# Patient Record
Sex: MALE | Race: BLACK OR AFRICAN AMERICAN | NOT HISPANIC OR LATINO | Employment: FULL TIME | URBAN - METROPOLITAN AREA
[De-identification: names, ages, dates, MRNs, and addresses within clinical notes are randomized per-mention and may not be internally consistent; named-entity substitution may affect disease eponyms.]

---

## 2023-02-15 PROBLEM — M48.062 LUMBAR STENOSIS WITH NEUROGENIC CLAUDICATION: Status: ACTIVE | Noted: 2023-02-15

## 2023-02-15 PROBLEM — E66.9 CLASS 1 OBESITY WITH BODY MASS INDEX (BMI) OF 33.0 TO 33.9 IN ADULT: Status: ACTIVE | Noted: 2023-02-15

## 2023-02-15 PROBLEM — E66.811 CLASS 1 OBESITY WITH BODY MASS INDEX (BMI) OF 33.0 TO 33.9 IN ADULT: Status: ACTIVE | Noted: 2023-02-15

## 2023-02-15 PROBLEM — E78.5 HYPERLIPIDEMIA: Status: ACTIVE | Noted: 2023-02-15

## 2023-02-15 PROBLEM — M51.16 LUMBAR DISC HERNIATION WITH RADICULOPATHY: Status: ACTIVE | Noted: 2023-02-15

## 2023-02-15 PROBLEM — M54.16 LUMBAR RADICULOPATHY: Status: ACTIVE | Noted: 2023-02-15

## 2023-02-15 PROBLEM — H52.4 MYOPIA WITH ASTIGMATISM AND PRESBYOPIA: Status: ACTIVE | Noted: 2023-02-15

## 2023-02-15 PROBLEM — H52.10 MYOPIA WITH ASTIGMATISM AND PRESBYOPIA: Status: ACTIVE | Noted: 2023-02-15

## 2023-02-15 PROBLEM — E11.9 DIABETES MELLITUS (MULTI): Status: ACTIVE | Noted: 2023-02-15

## 2023-02-15 PROBLEM — H52.209 MYOPIA WITH ASTIGMATISM AND PRESBYOPIA: Status: ACTIVE | Noted: 2023-02-15

## 2023-02-15 PROBLEM — M54.30 SCIATICA: Status: ACTIVE | Noted: 2023-02-15

## 2023-02-15 PROBLEM — H02.889 MGD (MEIBOMIAN GLAND DISEASE): Status: ACTIVE | Noted: 2023-02-15

## 2023-02-15 PROBLEM — I10 HYPERTENSION: Status: ACTIVE | Noted: 2023-02-15

## 2023-02-15 PROBLEM — R53.83 FATIGUE: Status: ACTIVE | Noted: 2023-02-15

## 2023-02-15 PROBLEM — H35.033 BILATERAL HYPERTENSIVE RETINOPATHY: Status: ACTIVE | Noted: 2023-02-15

## 2023-02-15 PROBLEM — M54.50 LOWER BACK PAIN: Status: ACTIVE | Noted: 2023-02-15

## 2023-02-15 PROBLEM — R68.82 LIBIDO, DECREASED: Status: ACTIVE | Noted: 2023-02-15

## 2023-02-15 PROBLEM — R94.31 ABNORMAL EKG: Status: ACTIVE | Noted: 2023-02-15

## 2023-02-15 PROBLEM — H26.9 CATARACT: Status: ACTIVE | Noted: 2023-02-15

## 2023-02-15 PROBLEM — N52.9 MALE ERECTILE DISORDER: Status: ACTIVE | Noted: 2023-02-15

## 2023-02-15 RX ORDER — OLMESARTAN MEDOXOMIL 40 MG/1
40 TABLET ORAL DAILY
COMMUNITY
End: 2023-03-07 | Stop reason: ALTCHOICE

## 2023-02-15 RX ORDER — SILDENAFIL 100 MG/1
1 TABLET, FILM COATED ORAL DAILY PRN
COMMUNITY
Start: 2018-09-12

## 2023-03-07 ENCOUNTER — OFFICE VISIT (OUTPATIENT)
Dept: PRIMARY CARE | Facility: CLINIC | Age: 58
End: 2023-03-07
Payer: COMMERCIAL

## 2023-03-07 DIAGNOSIS — Z00.00 HEALTH MAINTENANCE EXAMINATION: ICD-10-CM

## 2023-03-07 DIAGNOSIS — I10 HYPERTENSION, UNSPECIFIED TYPE: Primary | ICD-10-CM

## 2023-03-07 DIAGNOSIS — E11.69 TYPE 2 DIABETES MELLITUS WITH OTHER SPECIFIED COMPLICATION, WITHOUT LONG-TERM CURRENT USE OF INSULIN (MULTI): ICD-10-CM

## 2023-03-07 PROCEDURE — 4010F ACE/ARB THERAPY RXD/TAKEN: CPT | Performed by: STUDENT IN AN ORGANIZED HEALTH CARE EDUCATION/TRAINING PROGRAM

## 2023-03-07 PROCEDURE — 99204 OFFICE O/P NEW MOD 45 MIN: CPT | Performed by: STUDENT IN AN ORGANIZED HEALTH CARE EDUCATION/TRAINING PROGRAM

## 2023-03-07 RX ORDER — AMLODIPINE BESYLATE 10 MG/1
10 TABLET ORAL DAILY
Qty: 30 TABLET | Refills: 5 | Status: SHIPPED | OUTPATIENT
Start: 2023-03-07 | End: 2023-10-31

## 2023-03-07 RX ORDER — ATORVASTATIN CALCIUM 40 MG/1
40 TABLET, FILM COATED ORAL DAILY
Qty: 90 TABLET | Refills: 1 | Status: SHIPPED | OUTPATIENT
Start: 2023-03-07 | End: 2023-10-10

## 2023-03-07 RX ORDER — LISINOPRIL 20 MG/1
20 TABLET ORAL DAILY
COMMUNITY

## 2023-03-07 NOTE — PROGRESS NOTES
David Linton is a 57 y.o. MALE seen in Clinic at [facility] by Dr. Jarret Terry on 03/07/23 for routine care, as well as for management of the following chronic medical conditions: HTN (uncontrolled), T2DM (no recent A1C), obesity, seasonal allergies, lumbar radiculopathy.     HPI:   Acute Concerns:   #Uncontrolled HTN  - Amlodipine 10mg daily START today   - Lisinopril 20mg (current regimen); continuation long term pending lab work given no recent renal function   - no CP, SOB, abdominal pain, headaches, vision changes today despite profoundly elevated BP   [X] EKG: no evidence of LVH, non-specific T wave changes   [  ] sleep study may be considered in future     #T2DM  - Januvia 100mg daily only current regimen   [  ] optho referral today    [  ] labs including CMP, A1C today   [  ] T2DM regimen titration pending A1C; last A1C from 2019 7.6%  [  ] Metformin vs. SGLT-2 addition pending A1C and presence of diabetic nephropathy   [  ] start moderate/high intensity statin: Atorva 40 prescribed     #Obesity  - HTN and T2DM management as above  - lifestyle changes discussed at length  [  ] STEPHANIE screening, likely plan on formal sleep eval in near future     #Seasonal Allergies     #Lumbar Radiculopathy   - s/p spine surgery (laminectomy and decompression in 2016)   - no chronic pain medication at this time     ROS:   Denies headache, vision changes, chest pain    Past Medical History:   Past Medical History:   Diagnosis Date    Acute serous otitis media, recurrent, left ear 02/24/2017    Recurrent acute serous otitis media of left ear    Essential (primary) hypertension 12/07/2022    Hypertension    Other abnormal glucose 01/27/2015    Abnormal glucose    Other conditions influencing health status 10/28/2021    Diabetes mellitus type 2, uncontrolled    Other intervertebral disc displacement, lumbar region 08/14/2020    Disc displacement, lumbar    Other specified health status     No pertinent past surgical history     Personal history of other diseases of the nervous system and sense organs 02/24/2017    History of serous otitis media    Personal history of other endocrine, nutritional and metabolic disease     History of diabetes mellitus    Personal history of other endocrine, nutritional and metabolic disease 01/27/2015    History of obesity    Persons encountering health services in other specified circumstances 04/21/2015    Encounter to establish care     Subspecialty Medical Care: prior ortho spine     Past Surgical History: back surgery as above   No past surgical history on file.  Surgery/Location/Date:  in 2016    Medications: Lisinopril Januvia (MVI, Vitamin D)    Pharmacy: [facility] ([address] [place] [address])     Allergies: NKDA   No Known Allergies    Immunizations: PCV-20 deferred today but considering (next visit); Tdap today     Family History: HTN, T2DM   Family History   Problem Relation Name Age of Onset    Diabetes Mother      Hypertension Other uncle     Other (cardiac abnormality) Other uncle        Social History:   Home/Living Situation/Falls/Safety Assessment: lives at home with daughter, 16 years old  Education/Employment/Work/Vocational: paint mixing   Activities: prior exercise, has stopped due to work schedule; planning to resume; had improved Blood pressure when exercising regularly   Drug Use: non-smoker; alcohol use on weekends   Diet: poor dietary habits, high sodium diets   Depression/Anxiety: no depression or anxiety   Sexuality/Contraception/Menstrual History: one sexual partner, wears condoms  Sleep: poor sleep habits, worked 3rd shift for 10 years; does snore    Patient Information:  Health Insurance: has insurance   Transportation: drives   Healthcare POA/Guardian: emergency contact, Bhavani Pierce (mother); [phone]  Contact Information: [phone]         PHYSICAL EXAM:   General: well appearing AA male in NAD, pleasant and engaged in encounter    HEENT: NCAT, MMM, large neck circumference   CV:  RRR, no m/r/g  PULM: CTAB, non-labored respirations   ABD: soft, obese, NT, ND, + bowel sounds   : no suprapubic or CVA tenderness   EXT: WWP,  no significant edema   SKIN: no rashes noted   NEURO: A&Ox4, symmetric facies, no gross motor or sensory deficits, normal gait  PSYCH: pleasant mood, appropriate affect     Assessment/Plan    David Linton is a 57 y.o. [unfilled] seen in Clinic at [facility] by Dr. Jarret Terry on 03/07/23 for routine care, as well as for management of the following chronic medical conditions:HTN (uncontrolled), T2DM (no recent A1C), obesity, seasonal allergies, lumbar radiculopathy.     HPI:   Acute Concerns:   #Uncontrolled HTN  - Amlodipine 10mg daily START today   - Lisinopril 20mg (current regimen); continuation long term pending lab work given no recent renal function   - no CP, SOB, abdominal pain, headaches, vision changes today despite profoundly elevated BP   [X] EKG: no evidence of LVH, non-specific T wave changes   [  ] sleep study may be considered in future     #T2DM  - Januvia 100mg daily only current regimen   [  ] optho referral today    [  ] labs including CMP, A1C today   [  ] T2DM regimen titration pending A1C; last A1C from 2019 7.6%  [  ] Metformin vs. SGLT-2 addition pending A1C and presence of diabetic nephropathy   [  ] start moderate/high intensity statin: Atorva 40 prescribed     #Obesity  - HTN and T2DM management as above  - lifestyle changes discussed at length  [  ] STEPHANIE screening, likely plan on formal sleep eval in near future     #Seasonal Allergies     #Lumbar Radiculopathy   - s/p spine surgery (laminectomy and decompression in 2016)   - no chronic pain medication at this time     #Health Maintenance    Cancer Screening  - Colorectal Cancer Screening: cologuard pending   - Lung Cancer Screening: non-smoker   - Prostate Cancer Screening: PSA today     Laboratory Screening  - Lipid Screen: labs today   - ASCVD Score: labs today  - A1C, glucose screen: labs  today   - STI, HIV, Hep B screen: labs today   - Hep C screen: labs today     Imaging Screening  - AAA screening: due at 65    Immunizations:   - Influenza: annual flu shot recommended   - COVID: bivalent booster recommended   - Tdap: given today   - Prevnar, Pneumovax: plan for PCV-20 at next visit; deferred today, as he did not wish to get two shots simultaneously   - Shingrix: recommended     Other Screening  - Health Literacy Assessment: poor  - Depression screen: negative   - Home safety/partner violence screen: negative  - Hearing/Vision screens: optho referral   - Alcohol/tobacco/drug use screen: non-smoker   - Healthcare POA/Advanced Directives: Mother     Referrals: labs, EKG, optho, home BP monitoring   Return to clinic in 1-2 weeks for follow-up of blood pressure and lab work, sooner if acute issues arise or pending labs.     Patient Discussion:    Please call back the office with any questions at [phone]. In the case of an emergency, please call 911 or go to the nearest Emergency Department.      Jarret Terry MD  Internal Medicine-Pediatrics  [facility] [address], [address]  P: [phone], F: [phone]

## 2023-03-07 NOTE — PATIENT INSTRUCTIONS
Thank you for coming in to see us today!    Please start the Amlodipine medication in addition to your Lisinopril. I have also started a medication called Atorvastatin for your cholesterol.     Please get your labs done and our office will be in touch with plan regarding blood pressure medication next steps.     I will work on your paperwork in the next day or two.     I would like to see you back in 2 weeks for a check-in.     Your EKG was reassuring today, however if you develop any chest pain, shortness of breath, trouble with your vision, or headaches and your blood pressure is still this high, you should seek urgent medical attention.     Best,   Dr. Terry

## 2023-03-17 ENCOUNTER — LAB (OUTPATIENT)
Dept: LAB | Facility: LAB | Age: 58
End: 2023-03-17
Payer: COMMERCIAL

## 2023-03-17 DIAGNOSIS — I10 HYPERTENSION, UNSPECIFIED TYPE: ICD-10-CM

## 2023-03-17 DIAGNOSIS — E11.69 TYPE 2 DIABETES MELLITUS WITH OTHER SPECIFIED COMPLICATION, WITHOUT LONG-TERM CURRENT USE OF INSULIN (MULTI): ICD-10-CM

## 2023-03-17 DIAGNOSIS — Z00.00 HEALTH MAINTENANCE EXAMINATION: ICD-10-CM

## 2023-03-17 LAB
ALBUMIN (MG/L) IN URINE: 387 MG/L
ALBUMIN/CREATININE (UG/MG) IN URINE: 547.4 UG/MG CRT (ref 0–30)
CALCIDIOL (25 OH VITAMIN D3) (NG/ML) IN SER/PLAS: 23 NG/ML
CHOLESTEROL (MG/DL) IN SER/PLAS: 187 MG/DL (ref 0–199)
CHOLESTEROL IN HDL (MG/DL) IN SER/PLAS: 39.3 MG/DL
CHOLESTEROL/HDL RATIO: 4.8
CREATININE (MG/DL) IN URINE: 70.7 MG/DL (ref 20–370)
LDL: 123 MG/DL (ref 0–99)
SYPHILIS TOTAL AB: NONREACTIVE
THYROTROPIN (MIU/L) IN SER/PLAS BY DETECTION LIMIT <= 0.05 MIU/L: 0.96 MIU/L (ref 0.44–3.98)
TRIGLYCERIDE (MG/DL) IN SER/PLAS: 122 MG/DL (ref 0–149)
VLDL: 24 MG/DL (ref 0–40)

## 2023-03-17 PROCEDURE — 86780 TREPONEMA PALLIDUM: CPT

## 2023-03-17 PROCEDURE — 82570 ASSAY OF URINE CREATININE: CPT

## 2023-03-17 PROCEDURE — 82043 UR ALBUMIN QUANTITATIVE: CPT

## 2023-03-17 PROCEDURE — 80061 LIPID PANEL: CPT

## 2023-03-17 PROCEDURE — 84153 ASSAY OF PSA TOTAL: CPT

## 2023-03-17 PROCEDURE — 84154 ASSAY OF PSA FREE: CPT

## 2023-03-17 PROCEDURE — 36415 COLL VENOUS BLD VENIPUNCTURE: CPT

## 2023-03-17 PROCEDURE — 80053 COMPREHEN METABOLIC PANEL: CPT

## 2023-03-17 PROCEDURE — 84443 ASSAY THYROID STIM HORMONE: CPT

## 2023-03-17 PROCEDURE — 82306 VITAMIN D 25 HYDROXY: CPT

## 2023-03-20 LAB
ALANINE AMINOTRANSFERASE (SGPT) (U/L) IN SER/PLAS: 22 U/L (ref 10–52)
ALBUMIN (G/DL) IN SER/PLAS: 4.4 G/DL (ref 3.4–5)
ALKALINE PHOSPHATASE (U/L) IN SER/PLAS: 81 U/L (ref 33–120)
ANION GAP IN SER/PLAS: 23 MMOL/L (ref 10–20)
ASPARTATE AMINOTRANSFERASE (SGOT) (U/L) IN SER/PLAS: 16 U/L (ref 9–39)
BILIRUBIN TOTAL (MG/DL) IN SER/PLAS: 0.7 MG/DL (ref 0–1.2)
CALCIUM (MG/DL) IN SER/PLAS: 9.9 MG/DL (ref 8.6–10.6)
CARBON DIOXIDE, TOTAL (MMOL/L) IN SER/PLAS: 18 MMOL/L (ref 21–32)
CHLORIDE (MMOL/L) IN SER/PLAS: 100 MMOL/L (ref 98–107)
CREATININE (MG/DL) IN SER/PLAS: 1.07 MG/DL (ref 0.5–1.3)
GFR MALE: 81 ML/MIN/1.73M2
GLUCOSE (MG/DL) IN SER/PLAS: 318 MG/DL (ref 74–99)
POTASSIUM (MMOL/L) IN SER/PLAS: 4.4 MMOL/L (ref 3.5–5.3)
PROSTATE SPECIFIC AG (NG/ML) IN SER/PLAS: 0.5 NG/ML (ref 0–4)
PROSTATE SPECIFIC AG FREE (NG/ML) IN SER/PLAS: 0.1 NG/ML
PROSTATE SPECIFIC AG FREE/PROSTATE SPECIFIC AG TOTAL IN SER/PLAS: 20 %
PROTEIN TOTAL: 7.5 G/DL (ref 6.4–8.2)
SODIUM (MMOL/L) IN SER/PLAS: 137 MMOL/L (ref 136–145)
UREA NITROGEN (MG/DL) IN SER/PLAS: 15 MG/DL (ref 6–23)

## 2023-03-28 ENCOUNTER — PATIENT MESSAGE (OUTPATIENT)
Dept: PRIMARY CARE | Facility: CLINIC | Age: 58
End: 2023-03-28
Payer: COMMERCIAL

## 2023-03-28 DIAGNOSIS — E11.69 TYPE 2 DIABETES MELLITUS WITH OTHER SPECIFIED COMPLICATION, UNSPECIFIED WHETHER LONG TERM INSULIN USE (MULTI): Primary | ICD-10-CM

## 2023-03-28 NOTE — TELEPHONE ENCOUNTER
From: David Linton  To: Jarret Terry MD  Sent: 3/28/2023 4:11 PM EDT  Subject: Refill    Can you please send a prescription to the drug store for Januvia I'm all out thanks

## 2023-04-06 ENCOUNTER — APPOINTMENT (OUTPATIENT)
Dept: PRIMARY CARE | Facility: CLINIC | Age: 58
End: 2023-04-06
Payer: COMMERCIAL

## 2023-04-12 ENCOUNTER — OFFICE VISIT (OUTPATIENT)
Dept: PRIMARY CARE | Facility: CLINIC | Age: 58
End: 2023-04-12
Payer: COMMERCIAL

## 2023-04-12 DIAGNOSIS — E11.69 TYPE 2 DIABETES MELLITUS WITH OTHER SPECIFIED COMPLICATION, WITHOUT LONG-TERM CURRENT USE OF INSULIN (MULTI): Primary | ICD-10-CM

## 2023-04-12 PROCEDURE — 4010F ACE/ARB THERAPY RXD/TAKEN: CPT | Performed by: STUDENT IN AN ORGANIZED HEALTH CARE EDUCATION/TRAINING PROGRAM

## 2023-04-12 PROCEDURE — 99214 OFFICE O/P EST MOD 30 MIN: CPT | Performed by: STUDENT IN AN ORGANIZED HEALTH CARE EDUCATION/TRAINING PROGRAM

## 2023-04-12 NOTE — PROGRESS NOTES
David Linton is a 57 y.o. MALE seen in Clinic at [facility] [place] [facility] by Dr. Jarret Terry on 04/12/23 for routine care, as well as for management of the following chronic medical conditions: HTN (uncontrolled), T2DM (no recent A1C), obesity, seasonal allergies, lumbar radiculopathy. Patient here for T2DM follow up visit.     #Uncontrolled HTN (from last visit)   *PATIENT UNWILLING TO HAVE BP MEASURED TODAY  - Amlodipine 10mg, Lisinopril 20 daily   - no CP, SOB, abdominal pain, headaches, vision changes today   [X] EKG in 03/2023 without evidence of LVH, non-specific T wave changes   [  ] sleep study may be considered in future   [  ] close follow up if patient agreeable   [X] Cr 1.07 from recent labs 03/2023    #T2DM  - Januvia 100mg daily previous regimen  - Prior intolerance to Metformin   - Blood glucose in 300s (not fasting) at time of last labs; unfortunately A1C not collected despite lab in system, unable to be added on   [  ] instructed patient to return to lab for A1C testing   [  ] optho referral at last visit   [  ] trial SGLT-2 initiation given proteinuria/elevated urine albumin; START Jardiance 10mg daily; instructed patient to PLEASE CALL OFFICE and notify if cost prohibitive   - ACE-I as above  - High intensity statin, Atorva 40     #Obesity, BMI 33  - HTN, DLD, T2DM management as above  - lifestyle changes discussed at length  [  ] STEPHANIE screening, likely plan on formal sleep eval in near future     #Seasonal Allergies     #Lumbar Radiculopathy   - s/p spine surgery (laminectomy and decompression in 2016)   - no chronic pain medication at this time     ROS:   Denies headache, vision changes, chest pain    Past Medical History:   Past Medical History:   Diagnosis Date    Acute serous otitis media, recurrent, left ear 02/24/2017    Recurrent acute serous otitis media of left ear    Essential (primary) hypertension 12/07/2022    Hypertension    Other abnormal glucose 01/27/2015    Abnormal glucose    Other  conditions influencing health status 10/28/2021    Diabetes mellitus type 2, uncontrolled    Other intervertebral disc displacement, lumbar region 08/14/2020    Disc displacement, lumbar    Other specified health status     No pertinent past surgical history    Personal history of other diseases of the nervous system and sense organs 02/24/2017    History of serous otitis media    Personal history of other endocrine, nutritional and metabolic disease     History of diabetes mellitus    Personal history of other endocrine, nutritional and metabolic disease 01/27/2015    History of obesity    Persons encountering health services in other specified circumstances 04/21/2015    Encounter to establish care     Subspecialty Medical Care: prior ortho spine     Past Surgical History: back surgery as above   No past surgical history on file.  Surgery/Location/Date:  in 2016    Medications:   Current Outpatient Medications:     amLODIPine (Norvasc) 10 mg tablet, Take 1 tablet (10 mg) by mouth once daily., Disp: 30 tablet, Rfl: 5    atorvastatin (Lipitor) 40 mg tablet, Take 1 tablet (40 mg) by mouth once daily., Disp: 90 tablet, Rfl: 1    empagliflozin (Jardiance) 10 mg, Take 1 tablet (10 mg) by mouth once daily., Disp: 90 tablet, Rfl: 0    lisinopril 20 mg tablet, Take 1 tablet (20 mg) by mouth once daily., Disp: , Rfl:     sildenafil (Viagra) 100 mg tablet, Take 1 tablet (100 mg) by mouth if needed each day. 1 HOUR BEFORE SEXUAL ACTIVITY, Disp: , Rfl:    Pharmacy: [facility] ([id number])     Allergies: NKDA   Allergies   Allergen Reactions    No Known Allergies Unknown     Immunizations: PCV-20 deferred but considering     Family History: HTN, T2DM   Family History   Problem Relation Name Age of Onset    Diabetes Mother      Hypertension Other uncle     Other (cardiac abnormality) Other uncle      Social History:   Home/Living Situation/Falls/Safety Assessment: lives at home with daughter, 16 years  old  Education/Employment/Work/Vocational: paint mixing   Activities: prior exercise, has stopped due to work schedule; planning to resume; had improved Blood pressure when exercising regularly   Drug Use: non-smoker; alcohol use on weekends   Diet: poor dietary habits, high sodium diets   Depression/Anxiety: no depression or anxiety   Sexuality/Contraception/Menstrual History: one sexual partner, wears condoms  Sleep: poor sleep habits, worked 3rd shift for 10 years; does snore    Patient Information:  Health Insurance: has insurance   Transportation: drives   Healthcare POA/Guardian: emergency contact, Bhavani Pierce (mother); [phone]  Contact Information: [phone]    There were no vitals filed for this visit.  PATIENT REFUSED VITALS FOR TODAY'S VISIT. WANTED ONLY TO DISCUSS DIABETES MEDICATIONS.      PHYSICAL EXAM:   General: well appearing AA male in NAD, pleasant and engaged in encounter    HEENT: NCAT, MMM, large neck circumference   CV: RRR, no m/r/g  PULM: CTAB, non-labored respirations   ABD: soft, obese, NT, ND, + bowel sounds   : no suprapubic or CVA tenderness   EXT: WWP,  no significant edema   SKIN: no rashes noted   NEURO: A&Ox4, symmetric facies, no gross motor or sensory deficits, normal gait  PSYCH: pleasant mood, appropriate affect     Assessment/Plan    David Linton is a 57 y.o. MALE seen in Clinic at [facility] [place] [facility] by Dr. Jarret Terry on 04/12/23 for routine care, as well as for management of the following chronic medical conditions:HTN (uncontrolled), T2DM (no recent A1C), obesity, seasonal allergies, lumbar radiculopathy. Patient here for T2DM follow up visit.     #Uncontrolled HTN (from last visit)   *PATIENT UNWILLING TO HAVE BP MEASURED TODAY  - Amlodipine 10mg, Lisinopril 20 daily   - no CP, SOB, abdominal pain, headaches, vision changes today   [X] EKG in 03/2023 without evidence of LVH, non-specific T wave changes   [  ] sleep study may be considered in future   [  ] close follow  up if patient agreeable   [X] Cr 1.07 from recent labs 03/2023    #T2DM  - Januvia 100mg daily previous regimen  - Prior intolerance to Metformin   - Blood glucose in 300s (not fasting) at time of last labs; unfortunately A1C not collected despite lab in system, unable to be added on   [  ] instructed patient to return to lab for A1C testing   [  ] optho referral at last visit   [  ] trial SGLT-2 initiation given proteinuria/elevated urine albumin; START Jardiance 10mg daily; instructed patient to PLEASE CALL OFFICE and notify if cost prohibitive   - ACE-I as above  - High intensity statin, Atorva 40     #Obesity, BMI 33  - HTN, DLD, T2DM management as above  - lifestyle changes discussed at length  [  ] STEPHANIE screening, likely plan on formal sleep eval in near future     #Seasonal Allergies     #Lumbar Radiculopathy   - s/p spine surgery (laminectomy and decompression in 2016)   - no chronic pain medication at this time     #Health Maintenance    Cancer Screening  - Colorectal Cancer Screening: cologuard pending   - Lung Cancer Screening: non-smoker   - Prostate Cancer Screening: PSA WNL 03/2023    Laboratory Screening  - Lipid Screen: Atorva 40  - ASCVD Score: Type 2 DM  - A1C, glucose screen: encouraged to get A1C   - STI, HIV, Hep B screen: syphilis negative 03/2023  - Hep C screen:     Imaging Screening  - AAA screening: due at 65    Immunizations:   - Influenza: annual flu shot recommended   - COVID: bivalent booster recommended   - Tdap: recommended   - Prevnar, Pneumovax: PCV-20 recommended  - Shingrix: recommended     Other Screening  - Health Literacy Assessment: poor  - Depression screen: negative   - Home safety/partner violence screen: negative  - Hearing/Vision screens: optho recommended given T2DM   - Alcohol/tobacco/drug use screen: non-smoker   - Healthcare POA/Advanced Directives: Mother     Referrals: follow up labs  Return to clinic in 1-2 months for follow up of HTN and T2DM.     Patient  Discussion:    Please call back the office with any questions at [phone]. In the case of an emergency, please call 911 or go to the nearest Emergency Department.      Jarret Terry MD  Internal Medicine-Pediatrics  [facility] [address], [address]  P: [phone], F: [phone]

## 2023-07-02 DIAGNOSIS — E11.69 TYPE 2 DIABETES MELLITUS WITH OTHER SPECIFIED COMPLICATION, WITHOUT LONG-TERM CURRENT USE OF INSULIN (MULTI): ICD-10-CM

## 2023-07-03 RX ORDER — EMPAGLIFLOZIN 10 MG/1
TABLET, FILM COATED ORAL
Qty: 90 TABLET | Refills: 0 | Status: SHIPPED | OUTPATIENT
Start: 2023-07-03 | End: 2023-10-11

## 2023-10-10 DIAGNOSIS — E11.69 TYPE 2 DIABETES MELLITUS WITH OTHER SPECIFIED COMPLICATION, WITHOUT LONG-TERM CURRENT USE OF INSULIN (MULTI): ICD-10-CM

## 2023-10-10 RX ORDER — ATORVASTATIN CALCIUM 40 MG/1
40 TABLET, FILM COATED ORAL DAILY
Qty: 90 TABLET | Refills: 3 | Status: SHIPPED | OUTPATIENT
Start: 2023-10-10

## 2023-10-11 DIAGNOSIS — E11.69 TYPE 2 DIABETES MELLITUS WITH OTHER SPECIFIED COMPLICATION, WITHOUT LONG-TERM CURRENT USE OF INSULIN (MULTI): ICD-10-CM

## 2023-10-11 RX ORDER — EMPAGLIFLOZIN 10 MG/1
TABLET, FILM COATED ORAL
Qty: 90 TABLET | Refills: 0 | Status: SHIPPED | OUTPATIENT
Start: 2023-10-11 | End: 2024-02-16 | Stop reason: SDUPTHER

## 2023-10-31 DIAGNOSIS — I10 HYPERTENSION, UNSPECIFIED TYPE: ICD-10-CM

## 2023-10-31 RX ORDER — AMLODIPINE BESYLATE 10 MG/1
10 TABLET ORAL DAILY
Qty: 90 TABLET | Refills: 1 | Status: SHIPPED | OUTPATIENT
Start: 2023-10-31

## 2024-02-16 DIAGNOSIS — E11.69 TYPE 2 DIABETES MELLITUS WITH OTHER SPECIFIED COMPLICATION, WITHOUT LONG-TERM CURRENT USE OF INSULIN (MULTI): ICD-10-CM

## 2024-08-13 DIAGNOSIS — I10 HYPERTENSION, UNSPECIFIED TYPE: ICD-10-CM

## 2024-08-13 DIAGNOSIS — E11.69 TYPE 2 DIABETES MELLITUS WITH OTHER SPECIFIED COMPLICATION, WITHOUT LONG-TERM CURRENT USE OF INSULIN (MULTI): ICD-10-CM

## 2024-08-14 RX ORDER — ATORVASTATIN CALCIUM 40 MG/1
40 TABLET, FILM COATED ORAL DAILY
Qty: 90 TABLET | Refills: 0 | Status: SHIPPED | OUTPATIENT
Start: 2024-08-14

## 2024-08-14 RX ORDER — AMLODIPINE BESYLATE 10 MG/1
10 TABLET ORAL DAILY
Qty: 90 TABLET | Refills: 0 | Status: SHIPPED | OUTPATIENT
Start: 2024-08-14

## 2024-10-15 ENCOUNTER — APPOINTMENT (OUTPATIENT)
Dept: PRIMARY CARE | Facility: CLINIC | Age: 59
End: 2024-10-15
Payer: COMMERCIAL

## 2024-10-15 DIAGNOSIS — I10 PRIMARY HYPERTENSION: Primary | ICD-10-CM

## 2024-10-15 DIAGNOSIS — E11.69 TYPE 2 DIABETES MELLITUS WITH OTHER SPECIFIED COMPLICATION, WITHOUT LONG-TERM CURRENT USE OF INSULIN: ICD-10-CM

## 2024-10-15 DIAGNOSIS — Z13.0 SCREENING FOR DEFICIENCY ANEMIA: ICD-10-CM

## 2024-10-15 DIAGNOSIS — E78.5 DYSLIPIDEMIA: ICD-10-CM

## 2024-10-15 DIAGNOSIS — E55.9 MILD VITAMIN D DEFICIENCY: ICD-10-CM

## 2024-10-15 PROCEDURE — 4010F ACE/ARB THERAPY RXD/TAKEN: CPT | Performed by: STUDENT IN AN ORGANIZED HEALTH CARE EDUCATION/TRAINING PROGRAM

## 2024-10-15 RX ORDER — LISINOPRIL 20 MG/1
20 TABLET ORAL DAILY
Qty: 90 TABLET | Refills: 0 | Status: SHIPPED | OUTPATIENT
Start: 2024-10-15

## 2024-10-15 NOTE — PROGRESS NOTES
Updated labs, HTN refill. Due for updated in person visit.   Patient arrived 16 minutes late to visit, unable to be seen.   Will get labs today and re-scheduled for later this month.     Jarret Terry MD

## 2024-10-29 ENCOUNTER — APPOINTMENT (OUTPATIENT)
Dept: PRIMARY CARE | Facility: CLINIC | Age: 59
End: 2024-10-29
Payer: COMMERCIAL

## 2024-11-09 DIAGNOSIS — E11.69 TYPE 2 DIABETES MELLITUS WITH OTHER SPECIFIED COMPLICATION, WITHOUT LONG-TERM CURRENT USE OF INSULIN: ICD-10-CM

## 2024-11-09 DIAGNOSIS — I10 HYPERTENSION, UNSPECIFIED TYPE: ICD-10-CM

## 2024-11-10 DIAGNOSIS — E11.69 TYPE 2 DIABETES MELLITUS WITH OTHER SPECIFIED COMPLICATION, WITHOUT LONG-TERM CURRENT USE OF INSULIN: ICD-10-CM

## 2024-11-11 RX ORDER — AMLODIPINE BESYLATE 10 MG/1
10 TABLET ORAL DAILY
Qty: 90 TABLET | Refills: 0 | Status: SHIPPED | OUTPATIENT
Start: 2024-11-11

## 2024-11-11 RX ORDER — EMPAGLIFLOZIN 10 MG/1
TABLET, FILM COATED ORAL
Qty: 90 TABLET | Refills: 0 | Status: SHIPPED | OUTPATIENT
Start: 2024-11-11

## 2024-11-11 RX ORDER — ATORVASTATIN CALCIUM 40 MG/1
40 TABLET, FILM COATED ORAL DAILY
Qty: 90 TABLET | Refills: 0 | Status: SHIPPED | OUTPATIENT
Start: 2024-11-11

## 2025-01-11 DIAGNOSIS — I10 PRIMARY HYPERTENSION: ICD-10-CM

## 2025-01-11 RX ORDER — LISINOPRIL 20 MG/1
20 TABLET ORAL DAILY
Qty: 90 TABLET | Refills: 0 | Status: SHIPPED | OUTPATIENT
Start: 2025-01-11

## 2025-01-23 ENCOUNTER — APPOINTMENT (OUTPATIENT)
Dept: PRIMARY CARE | Facility: CLINIC | Age: 60
End: 2025-01-23
Payer: COMMERCIAL

## 2025-01-23 ENCOUNTER — LAB (OUTPATIENT)
Dept: LAB | Facility: LAB | Age: 60
End: 2025-01-23
Payer: COMMERCIAL

## 2025-01-23 VITALS
SYSTOLIC BLOOD PRESSURE: 165 MMHG | DIASTOLIC BLOOD PRESSURE: 114 MMHG | OXYGEN SATURATION: 96 % | BODY MASS INDEX: 31.7 KG/M2 | WEIGHT: 247 LBS | HEIGHT: 74 IN | HEART RATE: 93 BPM

## 2025-01-23 DIAGNOSIS — E11.69 TYPE 2 DIABETES MELLITUS WITH OTHER SPECIFIED COMPLICATION, WITHOUT LONG-TERM CURRENT USE OF INSULIN: ICD-10-CM

## 2025-01-23 DIAGNOSIS — E78.5 DYSLIPIDEMIA: ICD-10-CM

## 2025-01-23 DIAGNOSIS — I10 HYPERTENSION, UNSPECIFIED TYPE: ICD-10-CM

## 2025-01-23 DIAGNOSIS — E55.9 MILD VITAMIN D DEFICIENCY: ICD-10-CM

## 2025-01-23 DIAGNOSIS — K59.00 CONSTIPATION, UNSPECIFIED CONSTIPATION TYPE: ICD-10-CM

## 2025-01-23 DIAGNOSIS — I10 PRIMARY HYPERTENSION: ICD-10-CM

## 2025-01-23 DIAGNOSIS — Z23 IMMUNIZATION DUE: ICD-10-CM

## 2025-01-23 DIAGNOSIS — Z12.5 PROSTATE CANCER SCREENING: ICD-10-CM

## 2025-01-23 DIAGNOSIS — E11.69 TYPE 2 DIABETES MELLITUS WITH OTHER SPECIFIED COMPLICATION, WITHOUT LONG-TERM CURRENT USE OF INSULIN: Primary | ICD-10-CM

## 2025-01-23 DIAGNOSIS — Z13.0 SCREENING FOR DEFICIENCY ANEMIA: ICD-10-CM

## 2025-01-23 LAB
25(OH)D3 SERPL-MCNC: 13 NG/ML (ref 30–100)
ALBUMIN SERPL BCP-MCNC: 4.5 G/DL (ref 3.4–5)
ALP SERPL-CCNC: 82 U/L (ref 33–120)
ALT SERPL W P-5'-P-CCNC: 17 U/L (ref 10–52)
ANION GAP SERPL CALC-SCNC: 19 MMOL/L (ref 10–20)
AST SERPL W P-5'-P-CCNC: 15 U/L (ref 9–39)
BASOPHILS # BLD AUTO: 0.03 X10*3/UL (ref 0–0.1)
BASOPHILS NFR BLD AUTO: 0.6 %
BILIRUB SERPL-MCNC: 0.6 MG/DL (ref 0–1.2)
BUN SERPL-MCNC: 16 MG/DL (ref 6–23)
CALCIUM SERPL-MCNC: 9.5 MG/DL (ref 8.6–10.6)
CHLORIDE SERPL-SCNC: 96 MMOL/L (ref 98–107)
CHOLEST SERPL-MCNC: 197 MG/DL (ref 0–199)
CHOLESTEROL/HDL RATIO: 4.9
CO2 SERPL-SCNC: 25 MMOL/L (ref 21–32)
CREAT SERPL-MCNC: 1.09 MG/DL (ref 0.5–1.3)
EGFRCR SERPLBLD CKD-EPI 2021: 78 ML/MIN/1.73M*2
EOSINOPHIL # BLD AUTO: 0.11 X10*3/UL (ref 0–0.7)
EOSINOPHIL NFR BLD AUTO: 2.1 %
ERYTHROCYTE [DISTWIDTH] IN BLOOD BY AUTOMATED COUNT: 12.6 % (ref 11.5–14.5)
EST. AVERAGE GLUCOSE BLD GHB EST-MCNC: 324 MG/DL
GLUCOSE SERPL-MCNC: 270 MG/DL (ref 74–99)
HBA1C MFR BLD: 12.9 %
HCT VFR BLD AUTO: 46.7 % (ref 41–52)
HDLC SERPL-MCNC: 40.3 MG/DL
HGB BLD-MCNC: 15.5 G/DL (ref 13.5–17.5)
IMM GRANULOCYTES # BLD AUTO: 0.02 X10*3/UL (ref 0–0.7)
IMM GRANULOCYTES NFR BLD AUTO: 0.4 % (ref 0–0.9)
LDLC SERPL CALC-MCNC: 103 MG/DL
LYMPHOCYTES # BLD AUTO: 2.38 X10*3/UL (ref 1.2–4.8)
LYMPHOCYTES NFR BLD AUTO: 45.1 %
MCH RBC QN AUTO: 28.9 PG (ref 26–34)
MCHC RBC AUTO-ENTMCNC: 33.2 G/DL (ref 32–36)
MCV RBC AUTO: 87 FL (ref 80–100)
MONOCYTES # BLD AUTO: 0.38 X10*3/UL (ref 0.1–1)
MONOCYTES NFR BLD AUTO: 7.2 %
NEUTROPHILS # BLD AUTO: 2.36 X10*3/UL (ref 1.2–7.7)
NEUTROPHILS NFR BLD AUTO: 44.6 %
NON HDL CHOLESTEROL: 157 MG/DL (ref 0–149)
NRBC BLD-RTO: 0 /100 WBCS (ref 0–0)
PLATELET # BLD AUTO: 215 X10*3/UL (ref 150–450)
POTASSIUM SERPL-SCNC: 4.2 MMOL/L (ref 3.5–5.3)
PROT SERPL-MCNC: 7.5 G/DL (ref 6.4–8.2)
RBC # BLD AUTO: 5.37 X10*6/UL (ref 4.5–5.9)
SODIUM SERPL-SCNC: 136 MMOL/L (ref 136–145)
TRIGL SERPL-MCNC: 267 MG/DL (ref 0–149)
TSH SERPL-ACNC: 1.33 MIU/L (ref 0.44–3.98)
VLDL: 53 MG/DL (ref 0–40)
WBC # BLD AUTO: 5.3 X10*3/UL (ref 4.4–11.3)

## 2025-01-23 PROCEDURE — 1036F TOBACCO NON-USER: CPT | Performed by: STUDENT IN AN ORGANIZED HEALTH CARE EDUCATION/TRAINING PROGRAM

## 2025-01-23 PROCEDURE — 84154 ASSAY OF PSA FREE: CPT

## 2025-01-23 PROCEDURE — 82570 ASSAY OF URINE CREATININE: CPT

## 2025-01-23 PROCEDURE — 99215 OFFICE O/P EST HI 40 MIN: CPT | Performed by: STUDENT IN AN ORGANIZED HEALTH CARE EDUCATION/TRAINING PROGRAM

## 2025-01-23 PROCEDURE — 80053 COMPREHEN METABOLIC PANEL: CPT

## 2025-01-23 PROCEDURE — 90715 TDAP VACCINE 7 YRS/> IM: CPT | Performed by: STUDENT IN AN ORGANIZED HEALTH CARE EDUCATION/TRAINING PROGRAM

## 2025-01-23 PROCEDURE — 36415 COLL VENOUS BLD VENIPUNCTURE: CPT

## 2025-01-23 PROCEDURE — RXMED WILLOW AMBULATORY MEDICATION CHARGE

## 2025-01-23 PROCEDURE — 80061 LIPID PANEL: CPT

## 2025-01-23 PROCEDURE — 4010F ACE/ARB THERAPY RXD/TAKEN: CPT | Performed by: STUDENT IN AN ORGANIZED HEALTH CARE EDUCATION/TRAINING PROGRAM

## 2025-01-23 PROCEDURE — 90471 IMMUNIZATION ADMIN: CPT | Performed by: STUDENT IN AN ORGANIZED HEALTH CARE EDUCATION/TRAINING PROGRAM

## 2025-01-23 PROCEDURE — G2211 COMPLEX E/M VISIT ADD ON: HCPCS | Performed by: STUDENT IN AN ORGANIZED HEALTH CARE EDUCATION/TRAINING PROGRAM

## 2025-01-23 PROCEDURE — 85025 COMPLETE CBC W/AUTO DIFF WBC: CPT

## 2025-01-23 PROCEDURE — 3008F BODY MASS INDEX DOCD: CPT | Performed by: STUDENT IN AN ORGANIZED HEALTH CARE EDUCATION/TRAINING PROGRAM

## 2025-01-23 PROCEDURE — 84153 ASSAY OF PSA TOTAL: CPT

## 2025-01-23 PROCEDURE — 3077F SYST BP >= 140 MM HG: CPT | Performed by: STUDENT IN AN ORGANIZED HEALTH CARE EDUCATION/TRAINING PROGRAM

## 2025-01-23 PROCEDURE — 82043 UR ALBUMIN QUANTITATIVE: CPT

## 2025-01-23 PROCEDURE — 3080F DIAST BP >= 90 MM HG: CPT | Performed by: STUDENT IN AN ORGANIZED HEALTH CARE EDUCATION/TRAINING PROGRAM

## 2025-01-23 PROCEDURE — 82306 VITAMIN D 25 HYDROXY: CPT

## 2025-01-23 PROCEDURE — 84443 ASSAY THYROID STIM HORMONE: CPT

## 2025-01-23 PROCEDURE — 83036 HEMOGLOBIN GLYCOSYLATED A1C: CPT

## 2025-01-23 RX ORDER — LANCETS
EACH MISCELLANEOUS
Qty: 100 EACH | Refills: 3 | Status: SHIPPED | OUTPATIENT
Start: 2025-01-23

## 2025-01-23 RX ORDER — AMLODIPINE BESYLATE 10 MG/1
10 TABLET ORAL DAILY
Qty: 90 TABLET | Refills: 3 | Status: SHIPPED | OUTPATIENT
Start: 2025-01-23 | End: 2025-01-23

## 2025-01-23 RX ORDER — POLYETHYLENE GLYCOL 3350 17 G/17G
17 POWDER, FOR SOLUTION ORAL DAILY PRN
Qty: 30 EACH | Refills: 1 | Status: SHIPPED | OUTPATIENT
Start: 2025-01-23 | End: 2025-01-23

## 2025-01-23 RX ORDER — POLYETHYLENE GLYCOL 3350 17 G/17G
17 POWDER, FOR SOLUTION ORAL DAILY PRN
Qty: 30 EACH | Refills: 1 | Status: SHIPPED | OUTPATIENT
Start: 2025-01-23 | End: 2025-03-24

## 2025-01-23 RX ORDER — LISINOPRIL 20 MG/1
20 TABLET ORAL DAILY
Qty: 90 TABLET | Refills: 3 | Status: SHIPPED | OUTPATIENT
Start: 2025-01-23

## 2025-01-23 RX ORDER — DEXTROSE 4 G
TABLET,CHEWABLE ORAL
Qty: 1 EACH | Refills: 0 | Status: SHIPPED | OUTPATIENT
Start: 2025-01-23

## 2025-01-23 RX ORDER — LISINOPRIL 20 MG/1
20 TABLET ORAL DAILY
Qty: 90 TABLET | Refills: 3 | Status: SHIPPED | OUTPATIENT
Start: 2025-01-23 | End: 2025-01-23

## 2025-01-23 RX ORDER — ERGOCALCIFEROL 1.25 MG/1
50000 CAPSULE ORAL WEEKLY
Qty: 8 CAPSULE | Refills: 0 | Status: SHIPPED | OUTPATIENT
Start: 2025-01-23 | End: 2025-01-23

## 2025-01-23 RX ORDER — IBUPROFEN 200 MG
1 CAPSULE ORAL DAILY
Qty: 100 EACH | Refills: 3 | Status: SHIPPED | OUTPATIENT
Start: 2025-01-23 | End: 2025-01-23

## 2025-01-23 RX ORDER — AMLODIPINE BESYLATE 10 MG/1
10 TABLET ORAL DAILY
Qty: 90 TABLET | Refills: 3 | Status: SHIPPED | OUTPATIENT
Start: 2025-01-23

## 2025-01-23 RX ORDER — ROSUVASTATIN CALCIUM 40 MG/1
40 TABLET, COATED ORAL DAILY
Qty: 90 TABLET | Refills: 3 | Status: SHIPPED | OUTPATIENT
Start: 2025-01-23 | End: 2026-01-18

## 2025-01-23 RX ORDER — IBUPROFEN 200 MG
1 CAPSULE ORAL DAILY
Qty: 100 EACH | Refills: 3 | Status: SHIPPED | OUTPATIENT
Start: 2025-01-23

## 2025-01-23 RX ORDER — TIRZEPATIDE 2.5 MG/.5ML
2.5 INJECTION, SOLUTION SUBCUTANEOUS WEEKLY
Qty: 2 ML | Refills: 0 | Status: SHIPPED | OUTPATIENT
Start: 2025-01-23 | End: 2025-01-23

## 2025-01-23 RX ORDER — INSULIN PUMP SYRINGE, 3 ML
EACH MISCELLANEOUS
Qty: 1 EACH | Refills: 0 | Status: SHIPPED | OUTPATIENT
Start: 2025-01-23 | End: 2025-01-23

## 2025-01-23 RX ORDER — ERGOCALCIFEROL 1.25 MG/1
50000 CAPSULE ORAL WEEKLY
Qty: 8 CAPSULE | Refills: 0 | Status: SHIPPED | OUTPATIENT
Start: 2025-01-23 | End: 2025-03-20

## 2025-01-23 RX ORDER — TIRZEPATIDE 2.5 MG/.5ML
2.5 INJECTION, SOLUTION SUBCUTANEOUS WEEKLY
Qty: 2 ML | Refills: 0 | Status: SHIPPED | OUTPATIENT
Start: 2025-01-23

## 2025-01-23 RX ORDER — ROSUVASTATIN CALCIUM 40 MG/1
40 TABLET, COATED ORAL DAILY
Qty: 90 TABLET | Refills: 3 | Status: SHIPPED | OUTPATIENT
Start: 2025-01-23 | End: 2025-01-23

## 2025-01-23 RX ORDER — LANCETS
EACH MISCELLANEOUS
Qty: 100 EACH | Refills: 3 | Status: SHIPPED | OUTPATIENT
Start: 2025-01-23 | End: 2025-01-23

## 2025-01-23 ASSESSMENT — PAIN SCALES - GENERAL: PAINLEVEL_OUTOF10: 0-NO PAIN

## 2025-01-23 ASSESSMENT — PATIENT HEALTH QUESTIONNAIRE - PHQ9
2. FEELING DOWN, DEPRESSED OR HOPELESS: NOT AT ALL
SUM OF ALL RESPONSES TO PHQ9 QUESTIONS 1 AND 2: 0
1. LITTLE INTEREST OR PLEASURE IN DOING THINGS: NOT AT ALL

## 2025-01-23 NOTE — PATIENT INSTRUCTIONS
Labs from 10/15/2024 (both blood and urine), as well as PSA (prostate number; ordered today)  Get in [address]    Medications: sent to  [facility] with request for mail order/delivery. If issues, contact them at [phone].  BLOOD PRESSURE   - Lisinopril 20mg   - Amlodipine 10mg (NEW)    CHOLESTEROL:    - Rosuvastatin 40mg once daily    DIABETES:   - Jardiance 25mg (increased from 10mg)  - Mounjaro 2.5mg once weekly (new medication)   - Glucometer and supplies     Pharmacy referral--they will be in touch with you    Tetanus shot today  Think about other immunizations    Think about COLON CANCER SCREENING with the cologuard or colonoscopy     Follow up with me in 2 months  Reach out sooner if issues come up     Best,  Dr. ESCOBAR

## 2025-01-23 NOTE — PROGRESS NOTES
David Linton is a 59 y.o. MALE seen in Clinic at [facility] by Dr. Jarret Terry on 01/23/25 for routine care, as well as for management of the following chronic medical conditions: HTN (uncontrolled), T2DM (no recent A1C), obesity, seasonal allergies, lumbar radiculopathy. Patient here for T2DM follow up visit.      on POCT testing (fasting around 10 hours per patient)   Never got labs; will get today   OK with pharmacy and [facility]     #Uncontrolled HTN (from last visit)   - Amlodipine 10mg, Lisinopril 20 daily previously --> has only been on Lisinopril recently   - no CP, SOB, abdominal pain, headaches, vision changes today   [X] EKG in 03/2023 without evidence of LVH, non-specific T wave changes   [  ] sleep study may be considered in future   [  ] close follow up if patient agreeable   [X] Cr 1.07 from recent labs 03/2023  [  ] updated labs  [  ] resume Amlodipine   [  ] likely discuss echo at next visit   [  ] close follow up in 1-2 months     #T2DM  - Januvia 100mg daily previous regimen  - Prior intolerance to Metformin   - Blood glucose in 220 in office today (fasting)  [  ] instructed patient to return to lab for A1C testing   [  ] optho referral  [  ] labs today   [  ] titrate Jardiance to 25mg daily   [  ] new start GLP-1, Mounjaro 2.5mg weekly; Miralax given constipation history   [  ] glucometer and supplies  [  ] pharmacy referral   - ACE-I as above  - High intensity statin to be resumed, Rosuva 40     #Obesity, BMI 32  - HTN, DLD, T2DM management as above  - lifestyle changes discussed at length  [  ] STEPHANIE screening, likely plan on formal sleep eval in near future    #Seasonal Allergies     #Lumbar Radiculopathy   - s/p spine surgery (laminectomy and decompression in 2016)   - no chronic pain medication at this time     ROS:   Denies headache, vision changes, chest pain    Past Medical History:   Past Medical History:   Diagnosis Date    Acute serous otitis media, recurrent, left ear 02/24/2017     Recurrent acute serous otitis media of left ear    Essential (primary) hypertension 12/07/2022    Hypertension    Other abnormal glucose 01/27/2015    Abnormal glucose    Other conditions influencing health status 10/28/2021    Diabetes mellitus type 2, uncontrolled    Other intervertebral disc displacement, lumbar region 08/14/2020    Disc displacement, lumbar    Other specified health status     No pertinent past surgical history    Personal history of other diseases of the nervous system and sense organs 02/24/2017    History of serous otitis media    Personal history of other endocrine, nutritional and metabolic disease     History of diabetes mellitus    Personal history of other endocrine, nutritional and metabolic disease 01/27/2015    History of obesity    Persons encountering health services in other specified circumstances 04/21/2015    Encounter to establish care     Subspecialty Medical Care: prior ortho spine     Past Surgical History: back surgery as above   History reviewed. No pertinent surgical history.  Surgery/Location/Date:  in 2016    Medications:   Current Outpatient Medications:     sildenafil (Viagra) 100 mg tablet, Take 1 tablet (100 mg) by mouth once daily as needed. 1 HOUR BEFORE SEXUAL ACTIVITY, Disp: , Rfl:     amLODIPine (Norvasc) 10 mg tablet, Take 1 tablet (10 mg) by mouth once daily., Disp: 90 tablet, Rfl: 3    Blood glucose monitoring meter, Use daily or as directed for monitoring of diabetes., Disp: 1 each, Rfl: 0    blood sugar diagnostic (Blood Glucose Test) strip, 1 strip once daily., Disp: 100 each, Rfl: 3    empagliflozin (Jardiance) 25 mg, Take 1 tablet (25 mg) by mouth once daily., Disp: 90 tablet, Rfl: 3    lancets misc, Test blood glucose once daily., Disp: 100 each, Rfl: 3    lisinopril 20 mg tablet, Take 1 tablet (20 mg) by mouth once daily., Disp: 90 tablet, Rfl: 3    polyethylene glycol (Glycolax, Miralax) 17 gram packet, Take 17 g by mouth once daily as needed  (constipation). Mix 1 cap (17g) into 8 ounces of fluid., Disp: 30 each, Rfl: 1    rosuvastatin (Crestor) 40 mg tablet, Take 1 tablet (40 mg) by mouth once daily., Disp: 90 tablet, Rfl: 3    tirzepatide (Mounjaro) 2.5 mg/0.5 mL pen injector, Inject 2.5 mg under the skin 1 (one) time per week., Disp: 2 mL, Rfl: 0   Pharmacy: [facility] ([address] [place] [address])     Allergies: NKDA   Allergies   Allergen Reactions    No Known Allergies Unknown     Immunizations: Tdap today; defers all other immunizations     Family History: HTN, T2DM   Family History   Problem Relation Name Age of Onset    Diabetes Mother      Hypertension Other uncle     Other (cardiac abnormality) Other uncle      Social History:   Home/Living Situation/Falls/Safety Assessment: lives at home with daughter, 18 years old  Education/Employment/Work/Vocational: paint mixing   Activities: prior exercise, has stopped due to work schedule; planning to resume; had improved Blood pressure when exercising regularly   Drug Use: non-smoker; alcohol use on weekends   Diet: poor dietary habits, high sodium diets   Depression/Anxiety: no depression or anxiety   Sexuality/Contraception/Menstrual History: one sexual partner, wears condoms  Sleep: poor sleep habits, worked 3rd shift for 10 years; does snore    Patient Information:  Health Insurance: has insurance   Transportation: [facility] POA/Guardian: emergency contact, Bhavani Pierce (mother); [phone]  Contact Information: [phone]    Vitals:    01/23/25 0809   BP: (!) 165/114   Pulse: 93   SpO2: 96%     PHYSICAL EXAM:   General: well appearing AA male in NAD, pleasant and engaged in encounter    HEENT: NCAT, MMM, large neck circumference   CV: RRR, no m/r/g  PULM: CTAB, non-labored respirations   ABD: soft, obese, NT, ND, + bowel sounds   : no suprapubic or CVA tenderness   EXT: WWP,  no significant edema   SKIN: no rashes noted   NEURO: A&Ox4, symmetric facies, no gross motor or sensory deficits, normal  gait  PSYCH: pleasant mood, appropriate affect     Assessment/Plan    aDvid Linton is a 59 y.o. MALE seen in Clinic at [facility] [place] by Dr. Jarret Terry on 01/23/25 for routine care, as well as for management of the following chronic medical conditions:HTN (uncontrolled), T2DM (no recent A1C), obesity, seasonal allergies, lumbar radiculopathy. Patient here for T2DM follow up visit.      on POCT testing (fasting around 10 hours per patient)   Never got labs; will get today   OK with pharmacy and [facility]     #Uncontrolled HTN (from last visit)   - Amlodipine 10mg, Lisinopril 20 daily previously --> has only been on Lisinopril recently   - no CP, SOB, abdominal pain, headaches, vision changes today   [X] EKG in 03/2023 without evidence of LVH, non-specific T wave changes   [  ] sleep study may be considered in future   [  ] close follow up if patient agreeable   [X] Cr 1.07 from recent labs 03/2023  [  ] updated labs  [  ] resume Amlodipine   [  ] likely discuss echo at next visit   [  ] close follow up in 1-2 months     #T2DM  - Januvia 100mg daily previous regimen  - Prior intolerance to Metformin   - Blood glucose in 220 in office today (fasting)  [  ] instructed patient to return to lab for A1C testing   [  ] optho referral  [  ] labs today   [  ] titrate Jardiance to 25mg daily   [  ] new start GLP-1, Mounjaro 2.5mg weekly; Miralax given constipation history   [  ] glucometer and supplies  [  ] pharmacy referral   - ACE-I as above  - High intensity statin to be resumed, Rosuva 40     #Obesity, BMI 32  - HTN, DLD, T2DM management as above  - lifestyle changes discussed at length  [  ] STEPHANIE screening, likely plan on formal sleep eval in near future    #Seasonal Allergies     #Lumbar Radiculopathy   - s/p spine surgery (laminectomy and decompression in 2016)   - no chronic pain medication at this time     #Health Maintenance  DUE FOR CPE; perform at next visit in 03/2025     Cancer Screening  - Colorectal Cancer  Screening: PATIENT DEFERS TODAY   - Lung Cancer Screening: non-smoker   - Prostate Cancer Screening: PSA WNL 03/2023; labs today     Laboratory Screening  - Lipid Screen: resume Rosuva 40  - ASCVD Score: Type 2 DM  - A1C, glucose screen: encouraged to get A1C   - STI, HIV, Hep B screen: syphilis negative 03/2023  - Hep C screen:     Imaging Screening  - AAA screening: due at 65    Immunizations: Tdap today; defers all other immunizations   - Influenza: annual flu shot recommended   - COVID: bivalent booster recommended   - Tdap: given today    - Prevnar, Pneumovax: PCV-20 recommended  - Shingrix: recommended     Other Screening  - Health Literacy Assessment: poor but working to improve   - Depression screen: negative   - Home safety/partner violence screen: negative  - Hearing/Vision screens: optho recommended given T2DM   - Alcohol/tobacco/drug use screen: non-smoker   - Healthcare POA/Advanced Directives: Mother     Referrals: follow up labs  Return to clinic in 1-2 months for CPE and follow up of HTN, DLD, T2DM.     Patient Discussion:    Please call back the office with any questions at [phone]. In the case of an emergency, please call [phone] or go to the nearest Emergency Department.      Jarret Terry MD  Internal Medicine-Pediatrics  [facility] [address], [address]  P: [phone], F: [phone]

## 2025-01-24 ENCOUNTER — PHARMACY VISIT (OUTPATIENT)
Dept: PHARMACY | Facility: CLINIC | Age: 60
End: 2025-01-24
Payer: MEDICARE

## 2025-01-24 LAB
CREAT UR-MCNC: 51.3 MG/DL (ref 20–370)
MICROALBUMIN UR-MCNC: 167.6 MG/L
MICROALBUMIN/CREAT UR: 326.7 UG/MG CREAT
PSA FREE MFR SERPL: 25 %
PSA FREE SERPL-MCNC: 0.1 NG/ML
PSA SERPL IA-MCNC: 0.4 NG/ML (ref 0–4)

## 2025-01-27 PROCEDURE — RXMED WILLOW AMBULATORY MEDICATION CHARGE

## 2025-01-28 ENCOUNTER — APPOINTMENT (OUTPATIENT)
Dept: PHARMACY | Facility: HOSPITAL | Age: 60
End: 2025-01-28
Payer: COMMERCIAL

## 2025-01-28 DIAGNOSIS — E11.69 TYPE 2 DIABETES MELLITUS WITH OTHER SPECIFIED COMPLICATION, WITHOUT LONG-TERM CURRENT USE OF INSULIN: ICD-10-CM

## 2025-01-28 RX ORDER — TIRZEPATIDE 2.5 MG/.5ML
2.5 INJECTION, SOLUTION SUBCUTANEOUS WEEKLY
Qty: 2 ML | Refills: 3 | Status: SHIPPED | OUTPATIENT
Start: 2025-01-28

## 2025-01-28 NOTE — ASSESSMENT & PLAN NOTE
Patient's goal A1c is < 7%.  Is pt at goal? No, 12.9  Patient's SMBGs are NA.     Rationale for plan: pt received BG meter yesterday, aware to start measuring fasting BG daily. Mounjaro is approx $157 monthly, will apply for PAP. Pt to start mounjaro 2.5 mg weekly if PAP were to get approved and to continue jardiance 25 mg daily.    Medication Changes:  CONTINUE  Jardiance 25 mg D  Mounjaro 2.5 mg D

## 2025-01-28 NOTE — PROGRESS NOTES
Clinical Pharmacy Appointment    Patient ID: David Linton is a 59 y.o. male who presents for Diabetes.    Pt is here for First appointment.     Referring Provider: Jarret Terry MD      Subjective       HPI  DIABETES MELLITUS TYPE 2:    Diagnosed with diabetes:  NA. Known diabetic complications: NA.  Does patient follow with Endocrinology: No  Last optometry exam: 3432-9369  Most recent visit in Podiatry: NO-- patient denies sores or cuts on feet today      Current diabetic medications include:  Jardiance 25 mg D  Mounjaro 2.5 mg weekly    Clarifications to above regimen: has not started mounjaro due to cost  Adverse Effects: NA    Past diabetic medications include:  Metformin (could not tolerate)  Januvia    Glucose Readings:  Glucometer/CGM Type: recently got meter, will start in morning  Patient tests BG 0 times per day    Current home BG readings (mg/dL): NA   Previous home BG readings (mg/dL): NA    Any episodes of hypoglycemia? No,   .  Did patient treat episode of hypoglycemia appropriately? No,    Does the patient have a prescription for ready-to-use Glucagon? Not on insulin    Lifestyle:  Diet: 2-3 meals/day. Cooks, maribeth eating mixed diet.   Physical Activity: unable to work out due work  Tobacco history: no    Secondary Prevention:  Statin? No  ACE-I/ARB? Yes  Aspirin? No    Pertinent PMH Review:  PMH of Pancreatitis: No  PMH of Retinopathy: No  PMH of Urinary Tract Infections: No  PMH of MTC: No  UACR/EGFR in last year?: Yes  Albumin/Creatinine Ratio   Date Value Ref Range Status   01/23/2025 326.7 (H) <30.0 ug/mg Creat Final     Albumin/Creatine Ratio   Date Value Ref Range Status   03/17/2023 547.4 (H) 0.0 - 30.0 ug/mg crt Final         Medication Reconciliation:  Changed:   Added:   Discontinued:     Drug Interactions  No relevant drug interactions were noted.      Objective   Allergies   Allergen Reactions    No Known Allergies Unknown     Social History     Social History Narrative     Not on file        Vitals  BP Readings from Last 2 Encounters:   01/23/25 (!) 165/114   03/02/23 (!) 173/103     BMI Readings from Last 1 Encounters:   01/23/25 31.71 kg/m²      Labs  A1C  Lab Results   Component Value Date    HGBA1C 12.9 (H) 01/23/2025    HGBA1C 7.6 06/17/2019    HGBA1C 11.0 02/13/2018     BMP  Lab Results   Component Value Date    CALCIUM 9.5 01/23/2025     01/23/2025    K 4.2 01/23/2025    CO2 25 01/23/2025    CL 96 (L) 01/23/2025    BUN 16 01/23/2025    CREATININE 1.09 01/23/2025    EGFR 78 01/23/2025     LFTs  Lab Results   Component Value Date    ALT 17 01/23/2025    AST 15 01/23/2025    ALKPHOS 82 01/23/2025    BILITOT 0.6 01/23/2025     FLP  Lab Results   Component Value Date    TRIG 267 (H) 01/23/2025    CHOL 197 01/23/2025    LDLF 123 (H) 03/17/2023    LDLCALC 103 (H) 01/23/2025    HDL 40.3 01/23/2025     Urine Microalbumin  Lab Results   Component Value Date    MICROALBCREA 326.7 (H) 01/23/2025     Weight Management  Wt Readings from Last 3 Encounters:   01/23/25 112 kg (247 lb)   03/02/23 113 kg (248 lb 9.6 oz)   12/07/22 116 kg (255 lb 3 oz)      There is no height or weight on file to calculate BMI.      Patient Assistance Program (PAP)    Application for program to be submitted for the following medications: Jardiance and Mounro    [facility] Permanent Address: [facility]   Prescription Insurance:   Yes   Members of Household: 2   Files Taxes: Yes       Patient will be email financial information to pharmacist directly at [email or web address].    Patient verbally reports monthly or yearly income which is less than 400% federal poverty level    Patient aware this process may take up to 6 weeks.     If approved medication must be filled through [facility] and MEDICATION WILL BE MAILED TO PATIENT.          Assessment/Plan   Problem List Items Addressed This Visit       Diabetes mellitus (Multi)     Patient's goal A1c is < 7%.  Is pt at goal? No, 12.9  Patient's  SMBGs are NA.     Rationale for plan: pt received BG meter yesterday, aware to start measuring fasting BG daily. Mounjaro is approx $157 monthly, will apply for PAP. Pt to start mounjaro 2.5 mg weekly if PAP were to get approved and to continue jardiance 25 mg daily.    Medication Changes:  CONTINUE  Jardiance 25 mg D  Mounjaro 2.5 mg D              Clinical Pharmacist follow-up: 2/25 3:20pm,    Continue all meds under the continuation of care with the referring provider and clinical pharmacy team.    Thank you,  Suman Luz PharmD [facility]  Clinical Pharmacy Specialist, Primary Care     Verbal consent to manage patient's drug therapy was obtained from the patient. They were informed they may decline to participate or withdraw from participation in pharmacy services at any time.

## 2025-01-29 ENCOUNTER — PHARMACY VISIT (OUTPATIENT)
Dept: PHARMACY | Facility: CLINIC | Age: 60
End: 2025-01-29
Payer: MEDICARE

## 2025-02-04 PROCEDURE — RXMED WILLOW AMBULATORY MEDICATION CHARGE

## 2025-02-05 ENCOUNTER — PHARMACY VISIT (OUTPATIENT)
Dept: PHARMACY | Facility: CLINIC | Age: 60
End: 2025-02-05
Payer: MEDICARE

## 2025-02-17 PROCEDURE — RXMED WILLOW AMBULATORY MEDICATION CHARGE

## 2025-02-20 ENCOUNTER — PHARMACY VISIT (OUTPATIENT)
Dept: PHARMACY | Facility: CLINIC | Age: 60
End: 2025-02-20
Payer: MEDICARE

## 2025-02-20 PROCEDURE — RXMED WILLOW AMBULATORY MEDICATION CHARGE

## 2025-02-21 ENCOUNTER — PHARMACY VISIT (OUTPATIENT)
Dept: PHARMACY | Facility: CLINIC | Age: 60
End: 2025-02-21
Payer: MEDICARE

## 2025-02-25 ENCOUNTER — APPOINTMENT (OUTPATIENT)
Dept: PHARMACY | Facility: HOSPITAL | Age: 60
End: 2025-02-25
Payer: COMMERCIAL

## 2025-02-25 DIAGNOSIS — E11.69 TYPE 2 DIABETES MELLITUS WITH OTHER SPECIFIED COMPLICATION, WITHOUT LONG-TERM CURRENT USE OF INSULIN: ICD-10-CM

## 2025-02-25 PROCEDURE — RXMED WILLOW AMBULATORY MEDICATION CHARGE

## 2025-02-25 RX ORDER — TIRZEPATIDE 5 MG/.5ML
5 INJECTION, SOLUTION SUBCUTANEOUS WEEKLY
Qty: 2 ML | Refills: 3 | Status: SHIPPED | OUTPATIENT
Start: 2025-02-25

## 2025-02-25 NOTE — ASSESSMENT & PLAN NOTE
Patient's goal A1c is < 7%.  Is pt at goal? No, 12.9  Patient's SMBGs are NA.     Rationale for plan: pt had been testing fasting BG but did not have meter with him at the time. Pt tolerated mounjaro 2.5 mg weekly and agrees to start mounjaro 5 mg weekly. Pt will present fasting BG on next visit.    Medication Changes:  CONTINUE  Jardiance 25 mg D  STOP  Mounjaro 2.5 mg weekly  START  Mounjaro 5 mg weekly

## 2025-02-25 NOTE — PROGRESS NOTES
Clinical Pharmacy Appointment    Patient ID: David Linton is a 59 y.o. male who presents for Diabetes.    Pt is here for First appointment.     Referring Provider: Jarret Terry MD      Subjective       HPI  DIABETES MELLITUS TYPE 2:    Diagnosed with diabetes:  NA. Known diabetic complications: NA.  Does patient follow with Endocrinology: No  Last optometry exam: 4904-2589  Most recent visit in Podiatry: NO-- patient denies sores or cuts on feet today      Current diabetic medications include:  Jardiance 25 mg D  Mounjaro 2.5 mg weekly    Clarifications to above regimen: has 3 doses per box.   Adverse Effects: NA    Past diabetic medications include:  Metformin (could not tolerate)  Januvia    Glucose Readings:  Glucometer/CGM Type: recently got meter, will start in morning  Patient tests BG 1 times per day    Current home BG readings (mg/dL): NA - had started but did not have it with him  Previous home BG readings (mg/dL): NA    Any episodes of hypoglycemia? No,   .  Did patient treat episode of hypoglycemia appropriately? No,    Does the patient have a prescription for ready-to-use Glucagon? Not on insulin    Lifestyle:  Diet: 2-3 meals/day. Cooks, maribeth eating mixed diet.   Physical Activity: unable to work out due work  Tobacco history: no    Secondary Prevention:  Statin? No  ACE-I/ARB? Yes  Aspirin? No    Pertinent PMH Review:  PMH of Pancreatitis: No  PMH of Retinopathy: No  PMH of Urinary Tract Infections: No  PMH of MTC: No  UACR/EGFR in last year?: Yes  Albumin/Creatinine Ratio   Date Value Ref Range Status   01/23/2025 326.7 (H) <30.0 ug/mg Creat Final     Albumin/Creatine Ratio   Date Value Ref Range Status   03/17/2023 547.4 (H) 0.0 - 30.0 ug/mg crt Final         Medication Reconciliation:  Changed:   Added:   Discontinued:     Drug Interactions  No relevant drug interactions were noted.      Objective   Allergies   Allergen Reactions    No Known Allergies Unknown     Social History      Social History Narrative    Not on file        Vitals  BP Readings from Last 2 Encounters:   01/23/25 (!) 165/114   03/02/23 (!) 173/103     BMI Readings from Last 1 Encounters:   01/23/25 31.71 kg/m²      Labs  A1C  Lab Results   Component Value Date    HGBA1C 12.9 (H) 01/23/2025    HGBA1C 7.6 06/17/2019    HGBA1C 11.0 02/13/2018     BMP  Lab Results   Component Value Date    CALCIUM 9.5 01/23/2025     01/23/2025    K 4.2 01/23/2025    CO2 25 01/23/2025    CL 96 (L) 01/23/2025    BUN 16 01/23/2025    CREATININE 1.09 01/23/2025    EGFR 78 01/23/2025     LFTs  Lab Results   Component Value Date    ALT 17 01/23/2025    AST 15 01/23/2025    ALKPHOS 82 01/23/2025    BILITOT 0.6 01/23/2025     FLP  Lab Results   Component Value Date    TRIG 267 (H) 01/23/2025    CHOL 197 01/23/2025    LDLF 123 (H) 03/17/2023    LDLCALC 103 (H) 01/23/2025    HDL 40.3 01/23/2025     Urine Microalbumin  Lab Results   Component Value Date    MICROALBCREA 326.7 (H) 01/23/2025     Weight Management  Wt Readings from Last 3 Encounters:   01/23/25 112 kg (247 lb)   03/02/23 113 kg (248 lb 9.6 oz)   12/07/22 116 kg (255 lb 3 oz)      There is no height or weight on file to calculate BMI.      Patient Assistance Program (PAP)    Application for program to be submitted for the following medications: Jardiance and Mounjaro    [facility] Permanent Address: [facility]   Prescription Insurance:   Yes   Members of Household: 2   Files Taxes: Yes       Patient will be email financial information to pharmacist directly at [email or web address].    Patient verbally reports monthly or yearly income which is less than 400% federal poverty level    Patient aware this process may take up to 6 weeks.     If approved medication must be filled through [facility] and MEDICATION WILL BE MAILED TO PATIENT.          Assessment/Plan   Problem List Items Addressed This Visit       Diabetes mellitus (Multi)     Patient's goal A1c is < 7%.  Is  pt at goal? No, 12.9  Patient's SMBGs are NA.     Rationale for plan: pt had been testing fasting BG but did not have meter with him at the time. Pt tolerated mounjaro 2.5 mg weekly and agrees to start mounjaro 5 mg weekly. Pt will present fasting BG on next visit.    Medication Changes:  CONTINUE  Jardiance 25 mg D  STOP  Mounjaro 2.5 mg weekly  START  Mounjaro 5 mg weekly         Relevant Medications    tirzepatide (Mounjaro) 5 mg/0.5 mL pen injector    Other Relevant Orders    Referral to Clinical Pharmacy         Clinical Pharmacist follow-up: 3/25 3:20pm,    Continue all meds under the continuation of care with the referring provider and clinical pharmacy team.    Thank you,  Suman Luz PharmD [facility]  Clinical Pharmacy Specialist, Primary Care     Verbal consent to manage patient's drug therapy was obtained from the patient. They were informed they may decline to participate or withdraw from participation in pharmacy services at any time.

## 2025-02-27 ENCOUNTER — PHARMACY VISIT (OUTPATIENT)
Dept: PHARMACY | Facility: CLINIC | Age: 60
End: 2025-02-27
Payer: MEDICARE

## 2025-03-25 ENCOUNTER — APPOINTMENT (OUTPATIENT)
Dept: PHARMACY | Facility: HOSPITAL | Age: 60
End: 2025-03-25
Payer: COMMERCIAL

## 2025-03-25 DIAGNOSIS — E11.69 TYPE 2 DIABETES MELLITUS WITH OTHER SPECIFIED COMPLICATION, WITHOUT LONG-TERM CURRENT USE OF INSULIN: ICD-10-CM

## 2025-03-25 PROCEDURE — RXMED WILLOW AMBULATORY MEDICATION CHARGE

## 2025-03-25 RX ORDER — TIRZEPATIDE 7.5 MG/.5ML
7.5 INJECTION, SOLUTION SUBCUTANEOUS WEEKLY
Qty: 2 ML | Refills: 3 | Status: SHIPPED | OUTPATIENT
Start: 2025-03-25

## 2025-03-25 NOTE — PROGRESS NOTES
Clinical Pharmacy Appointment    Patient ID: David Linton is a 59 y.o. male who presents for Diabetes.    Pt is here for First appointment.     Referring Provider: Jarret Terry MD      Subjective       HPI  DIABETES MELLITUS TYPE 2:    Diagnosed with diabetes:  NA. Known diabetic complications: NA.  Does patient follow with Endocrinology: No  Last optometry exam: 2112-5826  Most recent visit in Podiatry: NO-- patient denies sores or cuts on feet today      Current diabetic medications include:  Jardiance 25 mg D  Mounjaro 5 mg weekly    Clarifications to above regimen: has 2 doses per box.   Adverse Effects: NA    Past diabetic medications include:  Metformin (could not tolerate)  Januvia    Glucose Readings:  Glucometer/CGM Type: recently got meter, will start in morning  Patient tests BG 1 times per day    Current home BG readings (mg/dL): reports in 90s. Highest in 90s, lowest 80.   Previous home BG readings (mg/dL): NA    Any episodes of hypoglycemia? No,   .  Did patient treat episode of hypoglycemia appropriately? No,    Does the patient have a prescription for ready-to-use Glucagon? Not on insulin    Lifestyle:  Diet: 2-3 meals/day. Cooks, reports eating mixed diet.   Physical Activity: unable to work out due work  Tobacco history: no    Secondary Prevention:  Statin? No  ACE-I/ARB? Yes  Aspirin? No    Pertinent PMH Review:  PMH of Pancreatitis: No  PMH of Retinopathy: No  PMH of Urinary Tract Infections: No  PMH of MTC: No  UACR/EGFR in last year?: Yes  Albumin/Creatinine Ratio   Date Value Ref Range Status   01/23/2025 326.7 (H) <30.0 ug/mg Creat Final     Albumin/Creatine Ratio   Date Value Ref Range Status   03/17/2023 547.4 (H) 0.0 - 30.0 ug/mg crt Final         Medication Reconciliation:  Changed:   Added:   Discontinued:     Drug Interactions  No relevant drug interactions were noted.      Objective   Allergies   Allergen Reactions    No Known Allergies Unknown     Social History      Social History Narrative    Not on file        Vitals  BP Readings from Last 2 Encounters:   01/23/25 (!) 165/114   03/02/23 (!) 173/103     BMI Readings from Last 1 Encounters:   01/23/25 31.71 kg/m²      Labs  A1C  Lab Results   Component Value Date    HGBA1C 12.9 (H) 01/23/2025    HGBA1C 7.6 06/17/2019    HGBA1C 11.0 02/13/2018     BMP  Lab Results   Component Value Date    CALCIUM 9.5 01/23/2025     01/23/2025    K 4.2 01/23/2025    CO2 25 01/23/2025    CL 96 (L) 01/23/2025    BUN 16 01/23/2025    CREATININE 1.09 01/23/2025    EGFR 78 01/23/2025     LFTs  Lab Results   Component Value Date    ALT 17 01/23/2025    AST 15 01/23/2025    ALKPHOS 82 01/23/2025    BILITOT 0.6 01/23/2025     FLP  Lab Results   Component Value Date    TRIG 267 (H) 01/23/2025    CHOL 197 01/23/2025    LDLF 123 (H) 03/17/2023    LDLCALC 103 (H) 01/23/2025    HDL 40.3 01/23/2025     Urine Microalbumin  Lab Results   Component Value Date    MICROALBCREA 326.7 (H) 01/23/2025     Weight Management  Wt Readings from Last 3 Encounters:   01/23/25 112 kg (247 lb)   03/02/23 113 kg (248 lb 9.6 oz)   12/07/22 116 kg (255 lb 3 oz)      There is no height or weight on file to calculate BMI.      Patient Assistance Program (PAP)    Application for program to be submitted for the following medications: Jardiance and Mounjaro    [facility] Permanent Address: [facility]   Prescription Insurance:   Yes   Members of Household: 2   Files Taxes: Yes       Patient will be email financial information to pharmacist directly at [email or web address].    Patient verbally reports monthly or yearly income which is less than 400% federal poverty level    Patient aware this process may take up to 6 weeks.     If approved medication must be filled through [facility] and MEDICATION WILL BE MAILED TO PATIENT.          Assessment/Plan   Problem List Items Addressed This Visit       Diabetes mellitus (Multi)     Patient's goal A1c is < 7%.  Is  pt at goal? No, 12.9  Patient's SMBGs are reports in 90s. Highest in 90s, lowest 80..     Rationale for plan: pt tolerated mounjaro with no ADRs. Pt reported he does not feel the appetite suppression and no change in eating habits, will recommend mounjaro 7.5 mg weekly. Pt reported controlled BG, advised to record number on phone for next visit.     Medication Changes:  CONTINUE  Jardiance 25 mg D  STOP  Mounjaro 5 mg weekly  START  Mounjaro 7.5 mg weekly                Clinical Pharmacist follow-up: 3/25 3:20pm,    Continue all meds under the continuation of care with the referring provider and clinical pharmacy team.    Thank you,  Suman Luz PharmD [facility]  Clinical Pharmacy Resident, Primary Care     Verbal consent to manage patient's drug therapy was obtained from the patient. They were informed they may decline to participate or withdraw from participation in pharmacy services at any time.

## 2025-03-25 NOTE — ASSESSMENT & PLAN NOTE
Patient's goal A1c is < 7%.  Is pt at goal? No, 12.9  Patient's SMBGs are reports in 90s. Highest in 90s, lowest 80..     Rationale for plan: pt tolerated mounjaro with no ADRs. Pt reported he does not feel the appetite suppression and no change in eating habits, will recommend mounjaro 7.5 mg weekly. Pt reported controlled BG, advised to record number on phone for next visit.     Medication Changes:  CONTINUE  Jardiance 25 mg D  STOP  Mounjaro 5 mg weekly  START  Mounjaro 7.5 mg weekly

## 2025-03-27 ENCOUNTER — PHARMACY VISIT (OUTPATIENT)
Dept: PHARMACY | Facility: CLINIC | Age: 60
End: 2025-03-27
Payer: MEDICARE

## 2025-04-17 ENCOUNTER — APPOINTMENT (OUTPATIENT)
Dept: PRIMARY CARE | Facility: CLINIC | Age: 60
End: 2025-04-17
Payer: COMMERCIAL

## 2025-04-17 DIAGNOSIS — I10 PRIMARY HYPERTENSION: Primary | ICD-10-CM

## 2025-04-17 PROCEDURE — 3049F LDL-C 100-129 MG/DL: CPT | Performed by: STUDENT IN AN ORGANIZED HEALTH CARE EDUCATION/TRAINING PROGRAM

## 2025-04-17 PROCEDURE — 3060F POS MICROALBUMINURIA REV: CPT | Performed by: STUDENT IN AN ORGANIZED HEALTH CARE EDUCATION/TRAINING PROGRAM

## 2025-04-17 PROCEDURE — RXMED WILLOW AMBULATORY MEDICATION CHARGE

## 2025-04-17 PROCEDURE — 3046F HEMOGLOBIN A1C LEVEL >9.0%: CPT | Performed by: STUDENT IN AN ORGANIZED HEALTH CARE EDUCATION/TRAINING PROGRAM

## 2025-04-17 PROCEDURE — 4010F ACE/ARB THERAPY RXD/TAKEN: CPT | Performed by: STUDENT IN AN ORGANIZED HEALTH CARE EDUCATION/TRAINING PROGRAM

## 2025-04-21 ENCOUNTER — PHARMACY VISIT (OUTPATIENT)
Dept: PHARMACY | Facility: CLINIC | Age: 60
End: 2025-04-21
Payer: MEDICARE

## 2025-04-22 ENCOUNTER — APPOINTMENT (OUTPATIENT)
Dept: PHARMACY | Facility: HOSPITAL | Age: 60
End: 2025-04-22
Payer: COMMERCIAL

## 2025-04-22 DIAGNOSIS — E11.69 TYPE 2 DIABETES MELLITUS WITH OTHER SPECIFIED COMPLICATION, WITHOUT LONG-TERM CURRENT USE OF INSULIN: ICD-10-CM

## 2025-04-22 PROCEDURE — RXMED WILLOW AMBULATORY MEDICATION CHARGE

## 2025-04-22 NOTE — ASSESSMENT & PLAN NOTE
Patient's goal A1c is < 7%.  Is pt at goal? No, 12.9  Patient's SMBGs are Highest: 150 Lowest: 90. Pts records sporadically.  Rationale for plan: pt does not test fasting BG, will order new A1c to assess DM control. Pt stated that mounjaro has no ADRs or effect on weight loss, still has appetite. Pt would like to stay on current dose and improve lifestyle. Pt to record fasting BG daily and complete A1c prior to visit. Pt to incorporate lifestyle modifications.     Medication Changes:  CONTINUE  Jardiance 25 mg D  Mounjaro 7.5 mg weekly

## 2025-04-22 NOTE — PROGRESS NOTES
Clinical Pharmacy Appointment    Patient ID: David Linton is a 59 y.o. male who presents for Diabetes.    Pt is here for Follow Up appointment.     Referring Provider: Jarret Terry MD      Subjective       HPI  DIABETES MELLITUS TYPE 2:    Diagnosed with diabetes:  NA. Known diabetic complications: NA.  Does patient follow with Endocrinology: No  Last optometry exam: 8560-5136  Most recent visit in Podiatry: NO-- patient denies sores or cuts on feet today      Current diabetic medications include:  Jardiance 25 mg D  Mounjaro 7.5 mg weekly    Clarifications to above regimen:  2 doses of mounjaro.   Adverse Effects: NA    Past diabetic medications include:  Metformin (could not tolerate)  Januvia    Glucose Readings:  Glucometer/CGM Type: recently got meter, will start in morning  Patient tests BG 1 times per day    Current home BG readings (mg/dL): Highest: 150 Lowest: 90. Pts records sporadically.  Previous home BG readings (mg/dL): reports in 90s. Highest in 90s, lowest 80.     Current weight: 250 (4/22/25)    Any episodes of hypoglycemia? No,   .  Did patient treat episode of hypoglycemia appropriately? No,    Does the patient have a prescription for ready-to-use Glucagon? Not on insulin    Lifestyle:  Diet: 2-3 meals/day. Cooks, reports eating mixed diet.   Physical Activity: unable to work out due work  Tobacco history: no    Secondary Prevention:  Statin? No  ACE-I/ARB? Yes  Aspirin? No    Pertinent PMH Review:  PMH of Pancreatitis: No  PMH of Retinopathy: No  PMH of Urinary Tract Infections: No  PMH of MTC: No  UACR/EGFR in last year?: Yes  Albumin/Creatinine Ratio   Date Value Ref Range Status   01/23/2025 326.7 (H) <30.0 ug/mg Creat Final     Albumin/Creatine Ratio   Date Value Ref Range Status   03/17/2023 547.4 (H) 0.0 - 30.0 ug/mg crt Final         Medication Reconciliation:  Changed:   Added:   Discontinued:     Drug Interactions  No relevant drug interactions were noted.      Objective    Allergies   Allergen Reactions    No Known Allergies Unknown     Social History     Social History Narrative    Not on file        Vitals  BP Readings from Last 2 Encounters:   01/23/25 (!) 165/114   03/02/23 (!) 173/103     BMI Readings from Last 1 Encounters:   01/23/25 31.71 kg/m²      Labs  A1C  Lab Results   Component Value Date    HGBA1C 12.9 (H) 01/23/2025    HGBA1C 7.6 06/17/2019    HGBA1C 11.0 02/13/2018     BMP  Lab Results   Component Value Date    CALCIUM 9.5 01/23/2025     01/23/2025    K 4.2 01/23/2025    CO2 25 01/23/2025    CL 96 (L) 01/23/2025    BUN 16 01/23/2025    CREATININE 1.09 01/23/2025    EGFR 78 01/23/2025     LFTs  Lab Results   Component Value Date    ALT 17 01/23/2025    AST 15 01/23/2025    ALKPHOS 82 01/23/2025    BILITOT 0.6 01/23/2025     FLP  Lab Results   Component Value Date    TRIG 267 (H) 01/23/2025    CHOL 197 01/23/2025    LDLF 123 (H) 03/17/2023    LDLCALC 103 (H) 01/23/2025    HDL 40.3 01/23/2025     Urine Microalbumin  Lab Results   Component Value Date    MICROALBCREA 326.7 (H) 01/23/2025     Weight Management  Wt Readings from Last 3 Encounters:   01/23/25 112 kg (247 lb)   03/02/23 113 kg (248 lb 9.6 oz)   12/07/22 116 kg (255 lb 3 oz)      There is no height or weight on file to calculate BMI.      Patient Assistance Program (PAP)    Application for program to be submitted for the following medications: Jardiance and [facility] Permanent Address: [facility]   Prescription Insurance:   Yes   Members of Household: 2   Files Taxes: Yes       Patient will be email financial information to pharmacist directly at [email or web address].    Patient verbally reports monthly or yearly income which is less than 400% federal poverty level    Patient aware this process may take up to 6 weeks.     If approved medication must be filled through [facility] and MEDICATION WILL BE MAILED TO PATIENT.          Assessment/Plan   Problem List Items  Addressed This Visit       Diabetes mellitus (Multi)    Patient's goal A1c is < 7%.  Is pt at goal? No, 12.9  Patient's SMBGs are Highest: 150 Lowest: 90. Pts records sporadically.  Rationale for plan: pt does not test fasting BG, will order new A1c to assess DM control. Pt stated that mounjaro has no ADRs or effect on weight loss, still has appetite. Pt would like to stay on current dose and improve lifestyle. Pt to record fasting BG daily and complete A1c prior to visit. Pt to incorporate lifestyle modifications.     Medication Changes:  CONTINUE  Jardiance 25 mg D  Mounjaro 7.5 mg weekly           Relevant Medications    empagliflozin (Jardiance) 25 mg tablet    Other Relevant Orders    Hemoglobin A1c    Comprehensive metabolic panel    Referral to Clinical Pharmacy             Clinical Pharmacist follow-up: 5/20 3:20pm,    Continue all meds under the continuation of care with the referring provider and clinical pharmacy team.    Thank you,  Suman Luz PharmD [facility]  Clinical Pharmacy Resident, Primary Care     Verbal consent to manage patient's drug therapy was obtained from the patient. They were informed they may decline to participate or withdraw from participation in pharmacy services at any time.

## 2025-04-24 ENCOUNTER — PHARMACY VISIT (OUTPATIENT)
Dept: PHARMACY | Facility: CLINIC | Age: 60
End: 2025-04-24
Payer: MEDICARE

## 2025-05-09 ENCOUNTER — APPOINTMENT (OUTPATIENT)
Dept: OPHTHALMOLOGY | Facility: CLINIC | Age: 60
End: 2025-05-09
Payer: COMMERCIAL

## 2025-05-15 PROCEDURE — RXMED WILLOW AMBULATORY MEDICATION CHARGE

## 2025-05-16 ENCOUNTER — PHARMACY VISIT (OUTPATIENT)
Dept: PHARMACY | Facility: CLINIC | Age: 60
End: 2025-05-16
Payer: MEDICARE

## 2025-05-17 PROCEDURE — RXMED WILLOW AMBULATORY MEDICATION CHARGE

## 2025-05-20 ENCOUNTER — APPOINTMENT (OUTPATIENT)
Dept: PHARMACY | Facility: HOSPITAL | Age: 60
End: 2025-05-20
Payer: COMMERCIAL

## 2025-05-22 ENCOUNTER — PHARMACY VISIT (OUTPATIENT)
Dept: PHARMACY | Facility: CLINIC | Age: 60
End: 2025-05-22
Payer: MEDICARE

## 2025-05-30 DIAGNOSIS — I10 PRIMARY HYPERTENSION: ICD-10-CM

## 2025-05-30 DIAGNOSIS — E11.69 TYPE 2 DIABETES MELLITUS WITH OTHER SPECIFIED COMPLICATION, WITHOUT LONG-TERM CURRENT USE OF INSULIN: ICD-10-CM

## 2025-05-30 DIAGNOSIS — I10 HYPERTENSION, UNSPECIFIED TYPE: ICD-10-CM

## 2025-05-30 DIAGNOSIS — E55.9 MILD VITAMIN D DEFICIENCY: Primary | ICD-10-CM

## 2025-05-30 DIAGNOSIS — E78.5 DYSLIPIDEMIA: ICD-10-CM

## 2025-05-30 RX ORDER — LISINOPRIL 20 MG/1
20 TABLET ORAL DAILY
Qty: 90 TABLET | Refills: 3 | Status: SHIPPED | OUTPATIENT
Start: 2025-05-30

## 2025-05-30 RX ORDER — AMLODIPINE BESYLATE 10 MG/1
10 TABLET ORAL DAILY
Qty: 90 TABLET | Refills: 3 | Status: SHIPPED | OUTPATIENT
Start: 2025-05-30

## 2025-05-30 RX ORDER — LANCETS
EACH MISCELLANEOUS
Qty: 100 EACH | Refills: 3 | Status: SHIPPED | OUTPATIENT
Start: 2025-05-30

## 2025-05-30 RX ORDER — ROSUVASTATIN CALCIUM 40 MG/1
40 TABLET, COATED ORAL DAILY
Qty: 90 TABLET | Refills: 3 | Status: SHIPPED | OUTPATIENT
Start: 2025-05-30 | End: 2026-05-25

## 2025-05-30 RX ORDER — ERGOCALCIFEROL 1.25 MG/1
1.25 CAPSULE ORAL
Qty: 8 CAPSULE | Refills: 2 | Status: SHIPPED | OUTPATIENT
Start: 2025-05-30

## 2025-06-16 ENCOUNTER — APPOINTMENT (OUTPATIENT)
Dept: PHARMACY | Facility: HOSPITAL | Age: 60
End: 2025-06-16
Payer: COMMERCIAL

## 2025-06-16 DIAGNOSIS — E11.69 TYPE 2 DIABETES MELLITUS WITH OTHER SPECIFIED COMPLICATION, WITHOUT LONG-TERM CURRENT USE OF INSULIN: ICD-10-CM

## 2025-06-16 RX ORDER — TIRZEPATIDE 10 MG/.5ML
1 INJECTION, SOLUTION SUBCUTANEOUS
Qty: 2 ML | Refills: 3 | Status: SHIPPED | OUTPATIENT
Start: 2025-06-16

## 2025-06-16 RX ORDER — LANCETS
EACH MISCELLANEOUS
Qty: 100 EACH | Refills: 3 | Status: SHIPPED | OUTPATIENT
Start: 2025-06-16

## 2025-06-16 NOTE — ASSESSMENT & PLAN NOTE
"Patient's goal A1c is < 7%.  Is pt at goal? No, 12.9%  Patient's SMBGs are elevated.     Rationale for plan: Patient tests infrequently, but numbers given are all above goal. Patient is hesitant to increase medication with concerns of \"overdoing it\", assured that this is still only a moderate dose of Mounjaro.    Medication Changes:  CONTINUE  Jardiance 25 mg - 1 tablet daily   INCREASE  Mounjaro 10 mg/0.5 mL - once weekly     Future Considerations:  Mounjaro titration as needed     Monitoring and Education:  Due for new A1C - order already placed  Continue testing FBG- new lancets Rx sent to      "

## 2025-06-16 NOTE — PROGRESS NOTES
Clinical Pharmacy Appointment    Patient ID: David Linton is a 59 y.o. male who presents for Diabetes.    Pt is here for Follow Up appointment.     Referring Provider: Jarret Terry MD      Subjective       HPI  DIABETES MELLITUS TYPE 2:    Diagnosed with diabetes:  NA. Known diabetic complications: NA.  Does patient follow with Endocrinology: No  Last optometry exam: 6339-2286  Most recent visit in Podiatry: NO-- patient denies sores or cuts on feet today      Current diabetic medications include:  Jardiance 25 mg - 1 tablet daily   Mounjaro 7.5 mg/0.5 mL - once weekly    Clarifications to above regimen:  None   Adverse Effects: NA    Past diabetic medications include:  Metformin (could not tolerate)  Januvia    Lifestyle:  Diet: 1-2 meals a day - breakfast and dinner  Physical Activity: unable to work out due work - active work- on feet and lifting - sometimes takes dog on walks   Tobacco history: no    Objective   Allergies   Allergen Reactions    No Known Allergies Unknown     Social History     Social History Narrative    Not on file      Glucose Readings:  Glucometer/CGM Type: recently got meter, will start in morning  Patient tests BG 1 times per day    Current home BG readings (mg/dL): 148, 187, 144, 204, 191 - mainly FBG   Previous home BG readings (mg/dL): Highest: 150 Lowest: 90. Pts records sporadically.    Current home weight:   Previous home weight: 250 (4/22/25)    Any episodes of hypoglycemia? No.     Secondary Prevention:  Statin? No  ACE-I/ARB? Yes  Aspirin? No    Pertinent PMH Review:  PMH of Pancreatitis: No  PMH of Retinopathy: No  PMH of Urinary Tract Infections: No  PMH of MTC: No  UACR/EGFR in last year?: Yes  Albumin/Creatinine Ratio   Date Value Ref Range Status   01/23/2025 326.7 (H) <30.0 ug/mg Creat Final     Albumin/Creatine Ratio   Date Value Ref Range Status   03/17/2023 547.4 (H) 0.0 - 30.0 ug/mg crt Final       Vitals  BP Readings from Last 2 Encounters:   01/23/25 (!)  "165/114   03/02/23 (!) 173/103     BMI Readings from Last 1 Encounters:   01/23/25 31.71 kg/m²      Labs  A1C  Lab Results   Component Value Date    HGBA1C 12.9 (H) 01/23/2025    HGBA1C 7.6 06/17/2019    HGBA1C 11.0 02/13/2018     BMP  Lab Results   Component Value Date    CALCIUM 9.5 01/23/2025     01/23/2025    K 4.2 01/23/2025    CO2 25 01/23/2025    CL 96 (L) 01/23/2025    BUN 16 01/23/2025    CREATININE 1.09 01/23/2025    EGFR 78 01/23/2025     LFTs  Lab Results   Component Value Date    ALT 17 01/23/2025    AST 15 01/23/2025    ALKPHOS 82 01/23/2025    BILITOT 0.6 01/23/2025     FLP  Lab Results   Component Value Date    TRIG 267 (H) 01/23/2025    CHOL 197 01/23/2025    LDLF 123 (H) 03/17/2023    LDLCALC 103 (H) 01/23/2025    HDL 40.3 01/23/2025     Urine Microalbumin  Lab Results   Component Value Date    MICROALBCREA 326.7 (H) 01/23/2025     Weight Management  Wt Readings from Last 3 Encounters:   01/23/25 112 kg (247 lb)   03/02/23 113 kg (248 lb 9.6 oz)   12/07/22 116 kg (255 lb 3 oz)      Not weighing self at home-     There is no height or weight on file to calculate BMI.      Patient Assistance Program (PAP)     Patient Assistance for Jardiance/Mounjaro approved through 2/4/2026. Will have to be renewed prior to that date to prevent lapse in coverage. Medication(s) will be received at no cost to patient from [facility].        Assessment/Plan   Problem List Items Addressed This Visit       Diabetes mellitus (Multi)    Patient's goal A1c is < 7%.  Is pt at goal? No, 12.9%  Patient's SMBGs are elevated.     Rationale for plan: Patient tests infrequently, but numbers given are all above goal. Patient is hesitant to increase medication with concerns of \"overdoing it\", assured that this is still only a moderate dose of Mounjaro.    Medication Changes:  CONTINUE  Jardiance 25 mg - 1 tablet daily   INCREASE  Mounjaro 10 mg/0.5 mL - once weekly     Future Considerations:  Mounjaro titration " as needed     Monitoring and Education:  Due for new A1C - order already placed  Continue testing FBG- new lancets Rx sent to             Relevant Medications    lancets misc    tirzepatide (Mounjaro) 10 mg/0.5 mL pen injector    Other Relevant Orders    Referral to Clinical Pharmacy     Clinical Pharmacist follow-up: 6 weeks - patient is to schedule in person visit with PCP     Continue all meds under the continuation of care with the referring provider and clinical pharmacy team.    Thank you,  Racquel MosesD, [facility]  Clinical Pharmacy Specialist, Primary Care     Verbal consent to manage patient's drug therapy was obtained from the patient. They were informed they may decline to participate or withdraw from participation in pharmacy services at any time.

## 2025-06-19 PROCEDURE — RXMED WILLOW AMBULATORY MEDICATION CHARGE

## 2025-06-23 ENCOUNTER — PHARMACY VISIT (OUTPATIENT)
Dept: PHARMACY | Facility: CLINIC | Age: 60
End: 2025-06-23
Payer: MEDICARE

## 2025-06-24 PROCEDURE — RXMED WILLOW AMBULATORY MEDICATION CHARGE

## 2025-06-26 ENCOUNTER — PHARMACY VISIT (OUTPATIENT)
Dept: PHARMACY | Facility: CLINIC | Age: 60
End: 2025-06-26
Payer: MEDICARE

## 2025-07-26 PROCEDURE — RXMED WILLOW AMBULATORY MEDICATION CHARGE

## 2025-07-30 ENCOUNTER — PHARMACY VISIT (OUTPATIENT)
Dept: PHARMACY | Facility: CLINIC | Age: 60
End: 2025-07-30
Payer: MEDICARE

## 2025-08-11 ENCOUNTER — APPOINTMENT (OUTPATIENT)
Dept: PHARMACY | Facility: HOSPITAL | Age: 60
End: 2025-08-11
Payer: COMMERCIAL

## 2025-08-11 DIAGNOSIS — E11.69 TYPE 2 DIABETES MELLITUS WITH OTHER SPECIFIED COMPLICATION, WITHOUT LONG-TERM CURRENT USE OF INSULIN: Primary | ICD-10-CM

## 2025-08-11 DIAGNOSIS — E78.5 DYSLIPIDEMIA: ICD-10-CM

## 2025-08-14 ENCOUNTER — TELEPHONE (OUTPATIENT)
Dept: PRIMARY CARE | Facility: CLINIC | Age: 60
End: 2025-08-14
Payer: COMMERCIAL

## 2025-08-17 PROCEDURE — RXMED WILLOW AMBULATORY MEDICATION CHARGE

## 2025-08-19 ENCOUNTER — PHARMACY VISIT (OUTPATIENT)
Dept: PHARMACY | Facility: CLINIC | Age: 60
End: 2025-08-19
Payer: MEDICARE

## 2025-09-15 ENCOUNTER — APPOINTMENT (OUTPATIENT)
Dept: PHARMACY | Facility: HOSPITAL | Age: 60
End: 2025-09-15
Payer: COMMERCIAL

## 2025-11-20 ENCOUNTER — APPOINTMENT (OUTPATIENT)
Dept: PRIMARY CARE | Facility: CLINIC | Age: 60
End: 2025-11-20
Payer: COMMERCIAL